# Patient Record
Sex: FEMALE
[De-identification: names, ages, dates, MRNs, and addresses within clinical notes are randomized per-mention and may not be internally consistent; named-entity substitution may affect disease eponyms.]

---

## 2018-05-07 ENCOUNTER — HOSPITAL ENCOUNTER (EMERGENCY)
Dept: HOSPITAL 88 - ER | Age: 83
LOS: 1 days | Discharge: NURSING, REHAB OR LONG-TERM CARE | End: 2018-05-08
Payer: MEDICARE

## 2018-05-07 VITALS — BODY MASS INDEX: 33.32 KG/M2 | HEIGHT: 65 IN | WEIGHT: 200 LBS

## 2018-05-07 PROCEDURE — 36415 COLL VENOUS BLD VENIPUNCTURE: CPT

## 2018-05-07 PROCEDURE — 99284 EMERGENCY DEPT VISIT MOD MDM: CPT

## 2018-05-07 PROCEDURE — 80048 BASIC METABOLIC PNL TOTAL CA: CPT

## 2018-05-07 PROCEDURE — 80076 HEPATIC FUNCTION PANEL: CPT

## 2018-05-08 VITALS — SYSTOLIC BLOOD PRESSURE: 182 MMHG | DIASTOLIC BLOOD PRESSURE: 96 MMHG

## 2018-05-08 LAB
ALBUMIN SERPL-MCNC: 2.6 G/DL (ref 3.5–5)
ALP SERPL-CCNC: 158 IU/L (ref 40–150)
ALT SERPL-CCNC: 34 IU/L (ref 0–55)
ANION GAP SERPL CALC-SCNC: 14.6 MMOL/L (ref 8–16)
BILIRUB CONJ SERPL-MCNC: 0.3 MG/DL (ref 0–0.5)
BUN SERPL-MCNC: 14 MG/DL (ref 7–26)
BUN/CREAT SERPL: 20 (ref 6–25)
CALCIUM SERPL-MCNC: 11 MG/DL (ref 8.4–10.2)
CHLORIDE SERPL-SCNC: 101 MMOL/L (ref 98–107)
CO2 SERPL-SCNC: 25 MMOL/L (ref 22–29)
EGFRCR SERPLBLD CKD-EPI 2021: > 60 ML/MIN (ref 60–?)
GLUCOSE SERPLBLD-MCNC: 105 MG/DL (ref 74–118)
POTASSIUM SERPL-SCNC: 3.6 MMOL/L (ref 3.5–5.1)
SODIUM SERPL-SCNC: 137 MMOL/L (ref 136–145)

## 2018-05-14 ENCOUNTER — HOSPITAL ENCOUNTER (INPATIENT)
Dept: HOSPITAL 88 - ER | Age: 83
LOS: 10 days | Discharge: HOSPICE | DRG: 308 | End: 2018-05-24
Attending: INTERNAL MEDICINE | Admitting: INTERNAL MEDICINE
Payer: MEDICARE

## 2018-05-14 VITALS — WEIGHT: 175.5 LBS | BODY MASS INDEX: 31.1 KG/M2 | HEIGHT: 63 IN

## 2018-05-14 VITALS — DIASTOLIC BLOOD PRESSURE: 81 MMHG | SYSTOLIC BLOOD PRESSURE: 180 MMHG

## 2018-05-14 DIAGNOSIS — I48.0: Primary | ICD-10-CM

## 2018-05-14 DIAGNOSIS — I69.319: ICD-10-CM

## 2018-05-14 DIAGNOSIS — M51.36: ICD-10-CM

## 2018-05-14 DIAGNOSIS — I69.354: ICD-10-CM

## 2018-05-14 DIAGNOSIS — Z91.81: ICD-10-CM

## 2018-05-14 DIAGNOSIS — C64.2: ICD-10-CM

## 2018-05-14 DIAGNOSIS — I50.32: ICD-10-CM

## 2018-05-14 DIAGNOSIS — R32: ICD-10-CM

## 2018-05-14 DIAGNOSIS — R13.10: ICD-10-CM

## 2018-05-14 DIAGNOSIS — Z74.01: ICD-10-CM

## 2018-05-14 DIAGNOSIS — I27.20: ICD-10-CM

## 2018-05-14 DIAGNOSIS — N39.0: ICD-10-CM

## 2018-05-14 DIAGNOSIS — F01.51: ICD-10-CM

## 2018-05-14 DIAGNOSIS — B96.20: ICD-10-CM

## 2018-05-14 DIAGNOSIS — I11.0: ICD-10-CM

## 2018-05-14 DIAGNOSIS — I69.391: ICD-10-CM

## 2018-05-14 DIAGNOSIS — F01.50: ICD-10-CM

## 2018-05-14 DIAGNOSIS — K29.70: ICD-10-CM

## 2018-05-14 DIAGNOSIS — Z66: ICD-10-CM

## 2018-05-14 DIAGNOSIS — E78.5: ICD-10-CM

## 2018-05-14 DIAGNOSIS — Z91.14: ICD-10-CM

## 2018-05-14 DIAGNOSIS — E21.3: ICD-10-CM

## 2018-05-14 DIAGNOSIS — B95.62: ICD-10-CM

## 2018-05-14 DIAGNOSIS — G93.41: ICD-10-CM

## 2018-05-14 DIAGNOSIS — I50.1: ICD-10-CM

## 2018-05-14 LAB
ALBUMIN SERPL-MCNC: 2.3 G/DL (ref 3.5–5)
ALBUMIN/GLOB SERPL: 0.4 {RATIO} (ref 0.8–2)
ALP SERPL-CCNC: 208 IU/L (ref 40–150)
ALT SERPL-CCNC: 73 IU/L (ref 0–55)
ANION GAP SERPL CALC-SCNC: 12.6 MMOL/L (ref 8–16)
BACTERIA URNS QL MICRO: (no result) /HPF
BASOPHILS # BLD AUTO: 0.1 10*3/UL (ref 0–0.1)
BASOPHILS NFR BLD AUTO: 0.4 % (ref 0–1)
BILIRUB UR QL: NEGATIVE
BUN SERPL-MCNC: 16 MG/DL (ref 7–26)
BUN/CREAT SERPL: 23 (ref 6–25)
CALCIUM SERPL-MCNC: 11.6 MG/DL (ref 8.4–10.2)
CHLORIDE SERPL-SCNC: 101 MMOL/L (ref 98–107)
CHOLEST SERPL-MCNC: 130 MD/DL (ref 0–199)
CHOLEST/HDLC SERPL: 4.2 {RATIO} (ref 3–3.6)
CK MB SERPL-MCNC: 0.7 NG/ML (ref 0–5)
CK SERPL-CCNC: < 7 IU/L (ref 29–168)
CLARITY UR: (no result)
CO2 SERPL-SCNC: 26 MMOL/L (ref 22–29)
COLOR UR: YELLOW
DEPRECATED NEUTROPHILS # BLD AUTO: 10.6 10*3/UL (ref 2.1–6.9)
DEPRECATED RBC URNS MANUAL-ACNC: (no result) /HPF (ref 0–5)
EGFRCR SERPLBLD CKD-EPI 2021: > 60 ML/MIN (ref 60–?)
EOSINOPHIL # BLD AUTO: 0.3 10*3/UL (ref 0–0.4)
EOSINOPHIL NFR BLD AUTO: 2.2 % (ref 0–6)
EPI CELLS URNS QL MICRO: (no result) /LPF
ERYTHROCYTE [DISTWIDTH] IN CORD BLOOD: 16.3 % (ref 11.7–14.4)
GLOBULIN PLAS-MCNC: 5.3 G/DL (ref 2.3–3.5)
GLUCOSE SERPLBLD-MCNC: 118 MG/DL (ref 74–118)
HCT VFR BLD AUTO: 33.5 % (ref 34.2–44.1)
HDLC SERPL-MSCNC: 31 MG/DL (ref 40–60)
HGB BLD-MCNC: 9.9 G/DL (ref 12–16)
IRON SATN MFR SERPL: 2 % (ref 15–50)
IRON SERPL-MCNC: 5 UG/DL (ref 50–170)
KETONES UR QL STRIP.AUTO: NEGATIVE
LDLC SERPL CALC-MCNC: 82 MG/DL (ref 60–130)
LEUKOCYTE ESTERASE UR QL STRIP.AUTO: (no result)
LYMPHOCYTES # BLD: 1.6 10*3/UL (ref 1–3.2)
LYMPHOCYTES NFR BLD AUTO: 12.1 % (ref 18–39.1)
MCH RBC QN AUTO: 22.2 PG (ref 28–32)
MCHC RBC AUTO-ENTMCNC: 29.6 G/DL (ref 31–35)
MCV RBC AUTO: 75.3 FL (ref 81–99)
MONOCYTES # BLD AUTO: 0.6 10*3/UL (ref 0.2–0.8)
MONOCYTES NFR BLD AUTO: 4.9 % (ref 4.4–11.3)
NEUTS SEG NFR BLD AUTO: 79.9 % (ref 38.7–80)
NITRITE UR QL STRIP.AUTO: POSITIVE
PLATELET # BLD AUTO: 298 X10E3/UL (ref 140–360)
POTASSIUM SERPL-SCNC: 3.6 MMOL/L (ref 3.5–5.1)
PROT UR QL STRIP.AUTO: (no result)
RBC # BLD AUTO: 4.45 X10E6/UL (ref 3.6–5.1)
SODIUM SERPL-SCNC: 136 MMOL/L (ref 136–145)
SP GR UR STRIP: 1.01 (ref 1.01–1.02)
TIBC SERPL-MCNC: 280 UG/DL (ref 261–478)
TRANSFERRIN SERPL-MCNC: 200 MG/DL (ref 180–382)
TRIGL SERPL-MCNC: 85 MG/DL (ref 0–149)
TSH SERPL DL<=0.005 MIU/L-ACNC: 2.07 UIU/ML (ref 0.35–4.94)
UROBILINOGEN UR STRIP-MCNC: 1 MG/DL (ref 0.2–1)
WBC #/AREA URNS HPF: (no result) /HPF (ref 0–5)

## 2018-05-14 PROCEDURE — 87086 URINE CULTURE/COLONY COUNT: CPT

## 2018-05-14 PROCEDURE — 80053 COMPREHEN METABOLIC PANEL: CPT

## 2018-05-14 PROCEDURE — 82553 CREATINE MB FRACTION: CPT

## 2018-05-14 PROCEDURE — 85610 PROTHROMBIN TIME: CPT

## 2018-05-14 PROCEDURE — 93005 ELECTROCARDIOGRAM TRACING: CPT

## 2018-05-14 PROCEDURE — 83970 ASSAY OF PARATHORMONE: CPT

## 2018-05-14 PROCEDURE — 36415 COLL VENOUS BLD VENIPUNCTURE: CPT

## 2018-05-14 PROCEDURE — 82550 ASSAY OF CK (CPK): CPT

## 2018-05-14 PROCEDURE — 83735 ASSAY OF MAGNESIUM: CPT

## 2018-05-14 PROCEDURE — 74230 X-RAY XM SWLNG FUNCJ C+: CPT

## 2018-05-14 PROCEDURE — 82746 ASSAY OF FOLIC ACID SERUM: CPT

## 2018-05-14 PROCEDURE — 82330 ASSAY OF CALCIUM: CPT

## 2018-05-14 PROCEDURE — 80202 ASSAY OF VANCOMYCIN: CPT

## 2018-05-14 PROCEDURE — 80061 LIPID PANEL: CPT

## 2018-05-14 PROCEDURE — 85025 COMPLETE CBC W/AUTO DIFF WBC: CPT

## 2018-05-14 PROCEDURE — 83519 RIA NONANTIBODY: CPT

## 2018-05-14 PROCEDURE — 83036 HEMOGLOBIN GLYCOSYLATED A1C: CPT

## 2018-05-14 PROCEDURE — 96361 HYDRATE IV INFUSION ADD-ON: CPT

## 2018-05-14 PROCEDURE — 0DB78ZX EXCISION OF STOMACH, PYLORUS, VIA NATURAL OR ARTIFICIAL OPENING ENDOSCOPIC, DIAGNOSTIC: ICD-10-PCS | Performed by: INTERNAL MEDICINE

## 2018-05-14 PROCEDURE — 84443 ASSAY THYROID STIM HORMONE: CPT

## 2018-05-14 PROCEDURE — 83880 ASSAY OF NATRIURETIC PEPTIDE: CPT

## 2018-05-14 PROCEDURE — 84439 ASSAY OF FREE THYROXINE: CPT

## 2018-05-14 PROCEDURE — 0DB88ZX EXCISION OF SMALL INTESTINE, VIA NATURAL OR ARTIFICIAL OPENING ENDOSCOPIC, DIAGNOSTIC: ICD-10-PCS | Performed by: INTERNAL MEDICINE

## 2018-05-14 PROCEDURE — 84484 ASSAY OF TROPONIN QUANT: CPT

## 2018-05-14 PROCEDURE — 77075 RADEX OSSEOUS SURVEY COMPL: CPT

## 2018-05-14 PROCEDURE — 82607 VITAMIN B-12: CPT

## 2018-05-14 PROCEDURE — 82164 ANGIOTENSIN I ENZYME TEST: CPT

## 2018-05-14 PROCEDURE — 87040 BLOOD CULTURE FOR BACTERIA: CPT

## 2018-05-14 PROCEDURE — 0DH68UZ INSERTION OF FEEDING DEVICE INTO STOMACH, VIA NATURAL OR ARTIFICIAL OPENING ENDOSCOPIC: ICD-10-PCS | Performed by: INTERNAL MEDICINE

## 2018-05-14 PROCEDURE — 83540 ASSAY OF IRON: CPT

## 2018-05-14 PROCEDURE — 84165 PROTEIN E-PHORESIS SERUM: CPT

## 2018-05-14 PROCEDURE — 84166 PROTEIN E-PHORESIS/URINE/CSF: CPT

## 2018-05-14 PROCEDURE — 76770 US EXAM ABDO BACK WALL COMP: CPT

## 2018-05-14 PROCEDURE — 86592 SYPHILIS TEST NON-TREP QUAL: CPT

## 2018-05-14 PROCEDURE — 99284 EMERGENCY DEPT VISIT MOD MDM: CPT

## 2018-05-14 PROCEDURE — 82306 VITAMIN D 25 HYDROXY: CPT

## 2018-05-14 PROCEDURE — 71045 X-RAY EXAM CHEST 1 VIEW: CPT

## 2018-05-14 PROCEDURE — 87186 SC STD MICRODIL/AGAR DIL: CPT

## 2018-05-14 PROCEDURE — 84100 ASSAY OF PHOSPHORUS: CPT

## 2018-05-14 PROCEDURE — 84466 ASSAY OF TRANSFERRIN: CPT

## 2018-05-14 PROCEDURE — 81001 URINALYSIS AUTO W/SCOPE: CPT

## 2018-05-14 PROCEDURE — 80048 BASIC METABOLIC PNL TOTAL CA: CPT

## 2018-05-14 RX ADMIN — HYDRALAZINE HYDROCHLORIDE PRN MG: 20 INJECTION INTRAMUSCULAR; INTRAVENOUS at 20:59

## 2018-05-14 NOTE — XMS REPORT
Patient Summary Document

 Created on: 2018



MICAH ROACH

External Reference #: [id number]

: 1929

Sex: Female



Demographics







 Address  [address]

[place] [address]

 

 Home Phone  [phone]

 

 Preferred Language  Unknown

 

 Marital Status  Unknown

 

 Bahai Affiliation  Unknown

 

 Race  Unknown

 

 Additional Race(s)  

 

 Ethnic Group  Unknown





Author







 [facility]  [facility]

 

 [facility]

 

 Address  Unknown

 

 Phone  Unavailable







Care Team Providers







 Care Team Member Name  Role  Phone

 

 LILI ROWLEY  Unavailable  Unavailable

 

 RAMÍREZ SOUTH  Unavailable  Unavailable

 

 PRINCESS HENSON  Unavailable  Unavailable







Problems

This patient has no known problems.



Allergies, Adverse Reactions, Alerts

This patient has no known allergies or adverse reactions.



Medications

This patient has no known medications.



Results







 Test Description  Test Time  Test Comments  Text Results  Atomic Results  
Result Comments









 CHEST SINGLE (PORTABLE)            [place] [address]      Patient Name: BARRY ROACH   MR #: [id number]    : 1929 Age/Sex: 88/F  Acct #: 
[id number] Req #: [id number]  Adm Physician:     Ordered by: LILI ROWLEY MD  Report #: [id number]   Location: ER  Room/Bed:     ________________________
___________________________________________________________________________    
Procedure: [id number] DX/CHEST SINGLE (PORTABLE)  Exam Date: 18           
                 Exam Time: 1705       REPORT STATUS: Signed    PROCEDURE: 
CHEST SINGLE (PORTABLE)   COMPARISON: 18.   INDICATIONS: CHEST PAIN. 
ALTERED MENTAL STATUS       FINDINGS:         LUNGS: Well-inflated. No mass, 
infiltrate, or vascular congestion.          PLEURA: No effusions or 
pneumothorax.       HEART  T     MEDIASTINUM: Stable cardiomegaly and aortic 
ectasia.       BONES  T    SOFT TISSUES: Degenerative changes of the shoulders 
are stable, left    more severe than the right. Soft tissues are unremarkable..
           CONCLUSION:          Stable cardiomegaly without vascular 
congestion. No acute pulmonary    process.               Dictated by:  Wilberto Benitez M.D. on 2018 at 17:27        Electronically approved by:  Wilberto Benitez M.D. on 2018 at    17:27                Dictated By: WILBERTO BENITEZ MD  Electronically Signed By: WILBERTO BENITEZ MD on 18  
Transcribed By: SIMONE on 18       COPY TO:   LILI ROWLEY MD   
        

 

 CHEST SINGLE (PORTABLE)            [place] [address]      Patient Name: BARRY ROAHC   MR #: [id number]    : 1929 Age/Sex: 88/F  Acct #: 
[id number] Req #: [id number]  [facility] Physician: RAMÍREZ SOUTH MD    Ordered by: 
RORY BLACKBURN MD  Report #: [id number]   Location: MED/SURG  Room/Bed: [facility]  
  ______________________________________________________________________________
_____________________    Procedure: [id number] DX/CHEST SINGLE (PORTABLE)  Exam 
Date: 18                            Exam Time: 1325       REPORT STATUS: 
Signed    PROCEDURE:   A single AP view of the chest.       COMPARISON: None.  
     INDICATIONS:   ELEVATED WHITE BLOOD COUNT           FINDINGS:   Lines/tubes
:  None.       Lungs: Lungs are well-inflated. Minimal bibasilar atelectasis. 
No    consolidation       Pleura:  There is no pleural effusion or 
pneumothorax.       Heart and mediastinum: Enlarged cardiac silhouette with 
central    pulmonary venous congestion.       Bones:  No acute bony 
abnormality.       IMPRESSION:        1. enlarged cardiac silhouette with 
central pulmonary venous    congestion. Mild bibasal atelectasis.            
Michael Garcia M.D.     Dictated by:  Michael Garcia M.D. on 2018 
at 14:11        Electronically approved by:  Michael Garcia M.D. on 2018 at    14:11                Dictated By: MICHAEL GARCIA MD  Electronically 
Signed By: MICHAEL GARCIA MD on 18 1411  Transcribed By: SIMONE on  141       COPY TO:   RORY BLACKBURN MD           

 

 FOOT LEFT COMPLETE            [place] [address]      Patient Name: BARRY ROACH
   MR #: [id number]    : 1929 Age/Sex: 88/F  Acct #: [id number] Req #
: [id number]  Adm Physician: RAMÍREZ SOUTH MD    Ordered by: DANTE FISCHER DO  
Report #: [id number]   Location: MED/SURG  Room/Bed: [facility]    ___________________
_______________________________________________________________________________
_    Procedure: [id number] DX/FOOT LEFT COMPLETE  Exam Date: 18           
                 Exam Time: 1435       REPORT STATUS: Signed    PROCEDURE:   X-
RAY LEFT FOOT, COMPLETE       COMPARISON:   None.       INDICATIONS:   RULE OUT 
FRACTURE       FINDINGS:      Generalized osteopenia, which limits evaluation 
of the bony structures.   No definite acute, displaced fractures or 
dislocations.   No lytic or blastic lesions.   Small inferior calcaneal 
enthesophyte.   Mild degenerative changes in the midfoot joints.   No 
significant soft tissue swelling.          CONCLUSION:      Generalized 
osteopenia limits evaluation of bony structures. No    definite acute displaced 
fracture or dislocation.        Michael Garcia M.D.     Dictated by:  
Michael Garcia M.D. on 2018 at 19:03        Electronically approved by
:  Michael Garcia M.D. on 2018 at    19:03                Dictated By: 
MICHAEL GARCIA MD  Electronically Signed By: MICHAEL GARCIA MD on 04/18/18 1903
  Transcribed By: SIMONE on 18       COPY TO:   DANTE FISCHER DO     
      

 

 HAND 3+ VIEWS LEFT            [place] [address]      Patient Name: BARRY ROACH
   MR #: [id number]    : 1929 Age/Sex: 88/F  Acct #: [id number] Req #
: [id number]  Adm Physician: RAMÍREZ SOUTH MD    Ordered by: RAMÍREZ OVALLE DO  
Report #: [id number]   Location: MED/SURG  Room/Bed: [facility]    ___________________
_______________________________________________________________________________
_    Procedure: [id number] DX/HAND 3+ VIEWS LEFT  Exam Date: 18           
                 Exam Time: 1010       REPORT STATUS: Signed    WRIST COMPLETE 
LEFT, HAND 3+ VIEWS LEFT       HISTORY:  Pain. Fall   COMPARISON: None 
available.           FINDINGS:   Bones:   Osseous demineralization which limits 
evaluation for subtle nondisplaced   fractures.   Cortical step-off of the base 
of the fourth proximal phalanx without   intra-articular extension. Chronic 
appearing fracture deformity of the fifth   phalanx.   Osseous alignment is 
within normal limits.      Joints:   Mild degenerative changes of the carpal 
and radiocarpal joints and scattered   interphalangeal joints.      Soft tissues
:   The soft tissues appear unremarkable.         IMPRESSION:    Minimally 
displaced fracture of the base of the fourth proximal phalanx.      Signed by: 
DR. aJspal Romero MD on 2018 10:54 AM        Dictated By: JASPAL ROMERO MD  Electronically Signed By: JASPAL ROMERO MD on 18  Transcribed By
: SERA on 18       COPY TO:   RAMÍREZ OVALLE DO           

 

 WRIST COMPLETE LEFT            [place] [address]      Patient Name: BARRY ROACH
   MR #: [id number]    : 1929 Age/Sex: 88/F  Acct #: [id number] Req #
: [id number]  Adm Physician: RAMÍREZ SOUTH MD    Ordered by: RAMÍREZ OVALLE DO  
Report #: [id number]   Location: MED/SURG  Room/Bed: [facility]    ___________________
_______________________________________________________________________________
_    Procedure: [id number] DX/WRIST COMPLETE LEFT  Exam Date: 18          
                  Exam Time: 1010       REPORT STATUS: Signed    WRIST COMPLETE 
LEFT, HAND 3+ VIEWS LEFT       HISTORY:  Pain. Fall   COMPARISON: None 
available.           FINDINGS:   Bones:   Osseous demineralization which limits 
evaluation for subtle nondisplaced   fractures.   Cortical step-off of the base 
of the fourth proximal phalanx without   intra-articular extension. Chronic 
appearing fracture deformity of the fifth   phalanx.   Osseous alignment is 
within normal limits.      Joints:   Mild degenerative changes of the carpal 
and radiocarpal joints and scattered   interphalangeal joints.      Soft tissues
:   The soft tissues appear unremarkable.         IMPRESSION:    Minimally 
displaced fracture of the base of the fourth proximal phalanx.      Signed by: 
DR. Jaspal Romero MD on 2018 10:54 AM        Dictated By: JASPAL ROMERO MD  Electronically Signed By: JASPAL ROMERO MD on 18 1054  Transcribed By
: SERA on 18 1054       COPY TO:   RAMÍREZ OVALLE DO           

 

 PELVIS AP 1-2 VIEWS            [place] [address]      Patient Name: BARRY ROACH
   MR #: [id number]    : 1929 Age/Sex: 88/F  Acct #: [id number] Req #
: [id number]  Adm Physician:     Ordered by: DEVAN CHRISTOPHER MD  Report #: [id number]
[id number]   Location: ER  Room/Bed:     ____________________________________________
_______________________________________________________    Procedure: [id number] 
DX/PELVIS AP 1-2 VIEWS  Exam Date:                             Exam Time:      
  REPORT STATUS: Signed    EXAM: PELVIS AP 1-2 VIEWS   INDICATION: Fall, pain   
COMPARISON: None      FINDINGS:   BONES:    No acute fractures.      JOINTS:   
Left hip arthroplasty. No evidence of hardware failure.      SOFT TISSUES:   
Normal      IMPRESSION:   No evidence of an acute pelvic fracture.         
Signed by: Dr. Natalia Barrios M.D. on 2018 6:57 AM        Dictated By: 
NATALIA BARRIOS MD  Electronically Signed By: NATALIA BARRIOS MD on 18 0657  
Transcribed By: SERA on 18 0657       COPY TO:   DEVAN CHRISTOPHER MD   
        

 

 SHOULDER LEFT COMPLETE            [place] [address]      Patient Name: BARRY ROACH   MR #: [id number]    : 1929 Age/Sex: 88/F  Acct #: 
[id number] Req #: [id number]  Adm Physician:     Ordered by: DEVAN CHRISTOPHER MD  Report #: [id number]   Location: ER  Room/Bed:     __________________________
_________________________________________________________________________    
Procedure: [id number] DX/SHOULDER LEFT COMPLETE  Exam Date:                     
        Exam Time:        REPORT STATUS: Signed    EXAM: SHOULDER LEFT COMPLETE
, AP, axial and scapular y-view   INDICATION: Left shoulder pain after fall   
COMPARISON: None      FINDINGS:   BONES:    Chronic deformity of the proximal 
left humerus and humeral head.      JOINTS:   Chronic deformity of the 
glenohumeral joint      SOFT TISSUES:   Normal      IMPRESSION:   Chronic 
deformity of the proximal left humerus and glenohumeral joint, which   appears 
fused.         Signed by: Dr. Natalia Barrios M.D. on 2018 6:56 AM     
   Dictated By: NATALIA BARRIOS MD  Electronically Signed By: NATALIA BARRIOS MD on 
18  Transcribed By: SERA on 18       COPY TO:   DEVAN CHRISTOPHER MD           

 

 CT CHEST WO            [place] [address]      Patient Name: BARRY ROACH   
MR #: [id number]    : 1929 Age/Sex: 88/F  Acct #: [id number] Req #: 
[id number]  Adm Physician:     Ordered by: DEVAN CHRISTOPHER MD  Report #: [id number]
[id number]   Location: ER  Room/Bed:     _____________________________________________
______________________________________________________    Procedure: [id number] 
CT/CT CHEST WO  Exam Date:                             Exam Time:        REPORT 
STATUS: Signed    EXAM: CT CHEST WO   INDICATION:  Back pain after tripping 
over RUG, left shoulder pain   COMPARISON:  CT of the abdomen and pelvis 
November 15, 2017   TECHNIQUE: Multidetector CT scanning of the chest was 
performed.  Coronal and   sagittal multiplanar reformations were obtained. 
Routine protocol performed.   IV Contrast: None   CTDIvol has been reviewed. It 
is below the limits set by the Radiation Protocol   Committee (RPC).      
FINDINGS:   LUNGS AND AIRWAYS: The trachea and major bronchi are unremarkable. 
  Very mild septal thickening and groundglass opacities.       PLEURA: Small 
amount of fluid in the left major fissure. No pneumothorax.      HEART, 
MEDIASTINUM, VESSELS: Cardiomegaly and small pericardial effusion.   
Atherosclerotic changes of the thoracic aorta without aneurysm. The main   
pulmonary artery is enlarged to 3.8 cm.      UPPER ABDOMEN: Lobulated kidneys 
bilaterally. Relatively stable appearance of   the 4.6 cm left renal mass.      
MUSCULOSKELETAL: No acute fracture. Chronic deformity of the left shoulder.    
  IMPRESSION:   No evidence of acute injury to the chest.      Cardiomegaly, 
small pericardial effusion, mild edema and possible pulmonary   hypertension.  
    Partially visualized left renal mass as previously described.            
Signed by: Dr. Natalia Barrios M.D. on 2018 7:03 AM        Dictated By: 
NATALIA BARRIOS MD  Electronically Signed By: NATALIA BARRIOS MD on 18  
Transcribed By: SERA on 18       COPY TO:   DEVAN CHRISTOPHER MD   
        

 

 CT BRAIN WO            [place] [address]      Patient Name: BARRY ROACH   
MR #: [id number]    : 1929 Age/Sex: 88/F  Acct #: [id number] Req #: 
[id number]  Adm Physician:     Ordered by: DEVAN CHRISTOPHER MD  Report #: [id number]
[id number]   Location: ER  Room/Bed:     _____________________________________________
______________________________________________________    Procedure: [id number] 
CT/CT BRAIN WO  Exam Date: 18                            Exam Time: 0610 
      REPORT STATUS: Signed    Exam: Head and cervical spine CT without IV 
contrast   History: Trauma, fall   Comparison studies: None      Technique:   
Axial images were obtained from the brain and cervical spine.   Coronal and 
sagittal images reconstructed from the axial data.   Intravenous contrast: None
      Findings:      Head CT:      Scalp: No abnormalities.   Bones: No 
fractures, blastic or lytic lesions.      Extra-axial spaces: No masses.  No 
fluid collections.      Brain sulci: Mildly prominent.   Ventricles: Mild 
compensatory dilatation. No hydrocephalus.      Parenchyma:    Recent (acute to 
subacute) nonhemorrhagic vascular insult in the right PCA   vascular territory 
with hypodensity and sulcal effacement in the right inferior   occipital lobe (
lingual and inferior occipital gyrus) which also involves the   medial 
occipitotemporal/fusiform gyri and right hippocampus. There are   age-
indeterminate lacunar infarcts in the right thalamocapsular region and in   the 
left vivien. Lacunar infarct in the vivien may be possibly be chronic with   
associated Wallerian degeneration along the left cortical spinal tract. There   
is a chronic lacunar infarct in the right thalamus. Scattered and mildly   
confluent hypodensities in the supratentorial white matter are nonspecific but 
  most compatible with chronic small vessel ischemic changes.      Sellar/
suprasellar region: No abnormalities.   Craniocervical junction: The foramen 
magnum is patent.  No Chiari one   malformation.      Cervical spine CT:      
Fractures: No acute fractures. Chronic type II dens fracture. Mild posterior   
angulation of the chronically fractured dens which is without osseous union to 
  the C2 vertebral body but is chronically fused to the anterior C1 arch. 
Chronic   reactive production at the tip of the dens with obliteration of the 
normal   basion-dental interval.      Soft tissues: No gross acute 
abnormalities.      Atlantoaxial articulation: Intact.   Alignment: 
Straightened cervical curvature. No acute subluxations.   Cervicomedullary 
junction: No abnormalities. The foramen magnum is patent.      Vertebrae:    No 
infection or neoplasm.      Degenerative changes:    Degenerated disks with 
mild loss of disc height and disc calcification from C2   to T1. Mild canal 
stenosis at C6-C7 due to disc osteophyte complex. Multilevel   moderate to 
severe facet arthrosis worse on the left at C4-C5. Multilevel   uncovertebral 
facet arthrosis result in multilevel foraminal stenosis (mild   left at C3-C4 
moderate left and mild right at C4-C5 moderate bilaterally at   C5-C6 and mild 
bilaterally at C6-C7).      Incidental findings:   Atherosclerotic 
calcifications in the cervical carotid bulbs, carotid siphons   and in the 
intradural vertebral arteries. Intraocular lens replacements related   to 
previous cataract surgery. Small focal gas at the right tracheoesophageal   
junction of the thoracic inlet probably is a small tracheal diverticulum.      
IMPRESSION:      Head CT:   1.  Recent (acute to subacute) nonhemorrhagic 
vascular insult in the right PCA   territory.   2.  Age-indeterminate right 
right thalamocapsular and pontine lacunar infarcts.   3.  No acute hemorrhage 
or other acute posttraumatic abnormalities.   4.  Mild to moderate chronic 
microvascular ischemic changes.    5.  Mild generalized volume loss.      
Cervical spine CT:   1.  No acute cervical spine fractures or subluxations.   
2.  Chronic type II dens fracture.   3.  Degenerative changes as described.   
4.  Cannot adequately evaluate ligament, spinal cord and or vascular   
abnormalities on the basis of this examination.      Findings discussed with 
Dr. Ovalle at 7:22 AM on 2018.      Signed by: Dr. Manny Cuello M.D. on  7:39 AM        Dictated By: MANNY CUELLO MD  Electronically Signed 
By: MANNY CUELLO MD on 18  Transcribed By: SERA on 18       COPY TO:   DEVAN CHRISTOPHER MD           

 

 CT CERVICAL SPINE WO            [place] [address]      Patient Name: BARRY ROACH
   MR #: [id number]    : 1929 Age/Sex: 88/F  Acct #: [id number] Req #
: [id number]  Adm Physician:     Ordered by: DEVAN CHRISTOPHER MD  Report #: [id number]
[id number]   Location: ER  Room/Bed:     ____________________________________________
_______________________________________________________    Procedure: [id number] 
CT/CT CERVICAL SPINE WO  Exam Date: 18                            Exam 
Time: 0610       REPORT STATUS: Signed    Exam: Head and cervical spine CT 
without IV contrast   History: Trauma, fall   Comparison studies: None      
Technique:   Axial images were obtained from the brain and cervical spine.   
Coronal and sagittal images reconstructed from the axial data.   Intravenous 
contrast: None      Findings:      Head CT:      Scalp: No abnormalities.   
Bones: No fractures, blastic or lytic lesions.      Extra-axial spaces: No 
masses.  No fluid collections.      Brain sulci: Mildly prominent.   Ventricles
: Mild compensatory dilatation. No hydrocephalus.      Parenchyma:    Recent (
acute to subacute) nonhemorrhagic vascular insult in the right PCA   vascular 
territory with hypodensity and sulcal effacement in the right inferior   
occipital lobe (lingual and inferior occipital gyrus) which also involves the   
medial occipitotemporal/fusiform gyri and right hippocampus. There are   age-
indeterminate lacunar infarcts in the right thalamocapsular region and in   the 
left vivien. Lacunar infarct in the vivien may be possibly be chronic with   
associated Wallerian degeneration along the left cortical spinal tract. There   
is a chronic lacunar infarct in the right thalamus. Scattered and mildly   
confluent hypodensities in the supratentorial white matter are nonspecific but 
  most compatible with chronic small vessel ischemic changes.      Sellar/
suprasellar region: No abnormalities.   Craniocervical junction: The foramen 
magnum is patent.  No Chiari one   malformation.      Cervical spine CT:      
Fractures: No acute fractures. Chronic type II dens fracture. Mild posterior   
angulation of the chronically fractured dens which is without osseous union to 
  the C2 vertebral body but is chronically fused to the anterior C1 arch. 
Chronic   reactive production at the tip of the dens with obliteration of the 
normal   basion-dental interval.      Soft tissues: No gross acute 
abnormalities.      Atlantoaxial articulation: Intact.   Alignment: 
Straightened cervical curvature. No acute subluxations.   Cervicomedullary 
junction: No abnormalities. The foramen magnum is patent.      Vertebrae:    No 
infection or neoplasm.      Degenerative changes:    Degenerated disks with 
mild loss of disc height and disc calcification from C2   to T1. Mild canal 
stenosis at C6-C7 due to disc osteophyte complex. Multilevel   moderate to 
severe facet arthrosis worse on the left at C4-C5. Multilevel   uncovertebral 
facet arthrosis result in multilevel foraminal stenosis (mild   left at C3-C4 
moderate left and mild right at C4-C5 moderate bilaterally at   C5-C6 and mild 
bilaterally at C6-C7).      Incidental findings:   Atherosclerotic 
calcifications in the cervical carotid bulbs, carotid siphons   and in the 
intradural vertebral arteries. Intraocular lens replacements related   to 
previous cataract surgery. Small focal gas at the right tracheoesophageal   
junction of the thoracic inlet probably is a small tracheal diverticulum.      
IMPRESSION:      Head CT:   1.  Recent (acute to subacute) nonhemorrhagic 
vascular insult in the right PCA   territory.   2.  Age-indeterminate right 
right thalamocapsular and pontine lacunar infarcts.   3.  No acute hemorrhage 
or other acute posttraumatic abnormalities.   4.  Mild to moderate chronic 
microvascular ischemic changes.    5.  Mild generalized volume loss.      
Cervical spine CT:   1.  No acute cervical spine fractures or subluxations.   
2.  Chronic type II dens fracture.   3.  Degenerative changes as described.   
4.  Cannot adequately evaluate ligament, spinal cord and or vascular   
abnormalities on the basis of this examination.      Findings discussed with 
Dr. Ovalle at 7:22 AM on 2018.      Signed by: Dr. Manny Cuello M.D. on  7:39 AM        Dictated By: MANNY CUELLO MD  Electronically Signed 
By: MANNY CUELLO MD on 18  Transcribed By: SERA on 18       COPY TO:   DEVAN CHRISTOPHER MD           

 

 BONE and/or JOINT WHOLE BODY            [place] [address]      Patient Name: 
MICAH ROACH   MR #: [id number]    : 1929 Age/Sex: 88/F  Acct #: 
[id number] Req #: [id number]  [facility] Physician:     Ordered by: PRINCESS HENSON MD  Report #: [id number]   Location: NM  Room/Bed:     __________________________
_________________________________________________________________________    
Procedure: [id number] NM/BONE and/or JOINT WHOLE BODY  Exam Date: 17      
                      Exam Time: 1130       REPORT STATUS: Signed    Bone Scan, 
delayed phase       INDICATION: 88 F with intermittent mid back pain.  Recent 
Diagnosis of renal   cell carcinoma.  She has sustained multiple falls.  MRI 
shows chronic   compression fracture at T11.      COMPARISON: CT abdomen and 
pelvis 11/15/2017; MRI spine 10/26/2017      REPORT: Following intravenous 
administration of 20 mCi of Tc-99m MDP, dynamic   flow and immediate blood pool 
images of the lower thoracic and lumbar spine and   3-hour delayed total body 
images in the anterior and posterior projections  and   selected spot images 
were obtained.       Dynamic flow and immediate blood pool images of the lower 
thoracic and lumbar   spine are unremarkable.      Right knee and left hip 
prostheses are noted and show no adjacent tracer   accumulation of concern.  
Small foci of increased tracer activity are seen in   the left glenoid, L3 and 
the inferior aspect of the right SI joint.  Very   mildly increased tracer is 
seen at T11. Mildly increased tracer is seen   bilaterally in L5/S1 in a 
pattern typical of degenerative change.   Otherwise,   distribution of tracer 
activity is unremarkable throughout the skeletal system.            No abnormal 
accumulation of tracer is seen in the soft tissues or urinary   tract.      
IMPRESSION:       1.  Osteoblastic lesions in the left glenoid, L3 and right SI 
joint inferiorly   are nonspecific in appearance and may represent metastases 
versus degenerative   changes. The lesion in the left glenoid is most worrisome 
for bone metastasis   and correlative imaging is warranted.  The lesions in L3 
and the right SI joint   don't have definite CT correlates.       2.  Mild 
osteoblastic process in T11 consistent with known chronic compression   
fracture.  Degenerative change is noted in L5/S1 bilaterally and corresponds to
   degenerative change seen on CT 11/15/2017.       Signed by: Dr. Petr Cazares M.D. on 2017 7:05 PM        Dictated By: PETR CAZARES MD  Electronically 
Signed By: PETR CAZARES MD on 17  Transcribed By: SERA on 17       COPY TO:   PRINCESS HENSON MD           

 

 CT ABDOMEN W            [place] [address]      Patient Name: MICAH ROACH   
MR #: [id number]    : 1929 Age/Sex: 88/F  Acct #: [id number] Req #: 
[id number]  Adm Physician:     Ordered by: RAMÍREZ SOUTH MD  Report #: [id number] 
  Location: CT  Room/Bed:     __________________________________________________
_________________________________________________    Procedure: [id number] CT/CT 
ABDOMEN W  Exam Date: 11/15/17                            Exam Time: 1730       
REPORT STATUS: Signed    EXAM: CT Abdomen and Pelvis WITH contrast     
INDICATION: EXAM: CT Abdomen WITH contrast     INDICATION: Left kidney mass 
partially visualized on the recent MRI of lumbar   spine dated 10/26/17.   
COMPARISON: None.   TECHNIQUE: Abdomen and pelvis were scanned utilizing a 
multidetector helical   scanner from the lung base to the iliac crests after 
administration of IV   contrast. Coronal and sagittal reformations were 
obtained. Routine protocol was   performed. Scan was performed when during 
portal venous phase.               IV CONTRAST: 100 mL of Omnipaque Isovue-370 
              ORAL CONTRAST: Water               RADIATION DOSE: Total DLP: 
373.87 mGy*cm                Estimated effective dose: (DLP x 0.015 x size 
factor) mSv               COMPLICATIONS: None      FINDINGS:      LINES and 
TUBES: None.      LOWER THORAX:  Right middle lobe and lingular linear 
atelectasis. Mild   calcifications of aortic and mitral annulus.      
HEPATOBILIARY: 1.1 cm rounded low-attenuation lesion in the lateral aspect of   
the right hepatic lobe on image 28 series 2 suggestive of a cyst. No biliary   
ductal dilation.       GALLBLADDER: Status post cholecystectomy. Bilateral 
cortical scarring.   Heterogeneous lesion partially exophytic of the upper pole 
of the left kidney   anteriorly measures 4.1 x 3.4 x 4.6 cm in sagittal, AP and 
transverse   dimensions, consistent with a renal cell carcinoma to proven 
otherwise.      SPLEEN: No splenomegaly.       PANCREAS: No focal masses or 
ductal dilatation.        ADRENALS: No adrenal nodules          KIDNEYS/URETERS
: Kidneys enhance symmetrically.  No hydronephrosis. No cystic   or solid mass 
lesions.  No stones.      GI TRACT: No abnormal distention, wall thickening, or 
evidence of bowel   obstruction.       Appendix is not visualized.      LYMPH 
NODES: No lymphadenopathy.      VESSELS: Atherosclerotic calcifications of the 
aorta and iliac arteries without   aneurysmal dilatation.      PERITONEUM / 
RETROPERITONEUM: No free air or fluid.      BONES: Generalized osteopenia. Mild 
compression deformity of T11. Mild loss of   height of the L1 vertebral body.  
    SOFT TISSUES: Unremarkable.         IMPRESSION:    1.  4.6 cm mass in the 
upper pole of the left kidney consistent with renal cell   carcinoma. No 
evidence of metastatic disease within the abdomen and pelvis.   Recommend 
urology consultation.      Signed by: Dr. JAMES Shannon M.D. on 11/15/
2017 6:43 PM        Dictated By: ELSA SHANNON MD, MD  Electronically Signed By
: ELSA SHANNON MD, MD on 11/15/17 1843  Transcribed By: SERA on 11/15/17 
1843       COPY TO:   RAMÍREZ SOUTH MD           

 

 SP LUMBAR, COMPLETE MIN 4VW            [place] [address]      Patient Name: 
MICAH ROACH   MR #: [id number]    : 1929 Age/Sex: 88/F  Acct #: 
[id number] Req #: [id number]  Adm Physician:     Ordered by: RAMÍREZ SOUTH MD  
Report #: [id number]   Location: MRI  Room/Bed:     _____________________________
______________________________________________________________________    
Procedure: [id number] DX/SP LUMBAR, COMPLETE MIN 4VW  Exam Date: 10/26/17       
                     Exam Time: 1145       REPORT STATUS: Signed    PROCEDURE: L
-SPINE COMPLETE 5 views including bilateral obliques.       COMPARISON: None.  
     INDICATIONS: BACK PAIN       FINDINGS:   The lumbar spine is in anatomic 
alignment without evidence of fracture,    spondylolisthesis, or spondylolysis.
  Vertebral body heights and disc    spaces are maintained.  Moderate facet 
arthrosis with severe at L4-L5    and L5-S1. The paraspinal soft tissues are 
normal.       Age-indeterminate compression deformity of T11 vertebral body 
with    roughly 50% height loss.       Significant atherosclerotic 
calcifications in the aorta.   Left total hip arthroplasty.   Right upper 
quadrant cholecystectomy clips.           CONCLUSION:   1. Mild to moderate 
degenerative changes in the lumbar spine,    predominantly involving the facet 
joints.   2. Age indeterminate T11 vertebral body compression deformity.        
Dictated by:  Lamin Nielson M.D. on 10/26/2017 at 12:41        Electronically 
approved by:  Lamin Nielson M.D. on 10/26/2017 at 12:41                Dictated By: 
LAMIN NIELSON MD  Electronically Signed By: LAMIN NIELSON MD on 10/26/17 124  Transcribed 
By: SIMONE on 10/26/17 1241       COPY TO:   RAMÍREZ SOUTH MD           

 

 MRI SPINE LUMBAR WO            [facility]   [address] [place] [address]      Patient Name: MICAH ROACH   MR #: [id number]    : 1929 Age/Sex: 88/F  Acct #: [id number] Req 
#: [id number]  Adm Physician:     Ordered by: RAMÍREZ SOUTH MD  Report #: [id number]
[id number]   Location: MRI  Room/Bed:     ____________________________________________
_______________________________________________________    Procedure: [id number] 
MRI/MRI SPINE LUMBAR WO  Exam Date: 10/26/17                            Exam 
Time: 1110       REPORT STATUS: Signed    EXAMINATION: MRI of the lumbar spine 
without contrast        HISTORY: Low back pain, radiculopathy with lower 
extremity numbness and   weakness   COMPARISON: None.   TECHNIQUE: Sagittal T1, 
T2, STIR; axial T2 and proton density.     Image quality: Motion artifact 
limits evaluation of some of the sequences.      FINDINGS:      It is assumed 
that there are 5 lumbar vertebrae.       Curvature/Alignment: Normal lordosis. 
      Vertebrae: No evidence of recent  fracture, infection, or neoplasm. Mild 
  kyphotic malalignment in thoracolumbar region due to chronic mild anterior   
wedging of the T11 vertebral body (decreased vertebral body height by   
approximately 25%), no posterior retropulsion or canal stenosis.       Conus: 
Normal, terminating at L1-L2       Cauda equina: Unremarkable.         Lower 
thoracic: Small 3 mm AP diameter left subarticular superior migrated   disc 
protrusion at T12-L1 without stenosis or compression       Paraspinal soft 
tissues: Prominent paraspinal lumbosacral musculature   atrophy. Partially 
visualized approximately 4.5 cm heterogeneous signal   intensity mass in the 
upper pole of the left kidney. Baseline irregular   bilateral kidneys which may 
be related to prior inflammatory/infection process.   Partially visualized 
nonspecific T2 hyperintense lesion in the right liver.      Degenerative changes
:          L1-L2: Symmetric disc bulge and facet arthrosis. Mild foraminal 
stenosis.          L2-L3: Mild symmetric disc bulge, ligamenta flava thickening 
and facet   arthrosis. Moderate spinal canal stenosis. No significant foraminal 
stenosis.          L3-L4: Bilateral facet arthrosis. Mild left foraminal 
stenoses          L4-L5: Mild asymmetric the left disc bulge (with a small 
posterior annular   fissure), ligamenta flava thickening and facet arthrosis. 
Moderate spinal canal   stenosis. Moderate left and mild right foraminal 
stenoses          L5-S1: Moderate facet arthrosis. Mild symmetric disc bulge. 
Mild bilateral   foraminal narrowing.      Sacroiliac joints:   Mild 
degenerative changes bilaterally.      IMPRESSION:      1.  Partially 
visualized mass in the upper pole of the left kidney. An abdomen   and pelvis 
CT with contrast is recommended for further evaluation.      2.  Moderate 
degenerative spinal canal stenosis at L2-3 and L4-5.      3.  Moderate 
degenerative foraminal stenosis on the left at L4-L5 and mild on   the right at 
L4-L5 and bilaterally at L5-S1.      4.  Mild chronic compression fracture of 
the T11 vertebral body with mild   kyphotic malalignment. No acute lumbar spine 
fractures.      The findings were discussed with the attending physician Dr. South at the time   of this dictation.      Signed by: Dr. Constance Chatterjee M.D. on 
10/26/2017 1:22 PM        Dictated By: CONSTANCE CHATTERJEE MD  Electronically Signed By
: CONSTANCE CHATTERJEE MD on 10/26/17 1322  Transcribed By: SERA on 10/26/17 1322    
   COPY TO:   RAMÍREZ SOUTH MD

## 2018-05-14 NOTE — DIAGNOSTIC IMAGING REPORT
PROCEDURE: CHEST SINGLE (PORTABLE)

COMPARISON: 4/20/18.

INDICATIONS: CHEST PAIN. ALTERED MENTAL STATUS

 

FINDINGS:



LUNGS: Well-inflated. No mass, infiltrate, or vascular congestion.

 

PLEURA: No effusions or pneumothorax.

 

HEART \T\ 

MEDIASTINUM: Stable cardiomegaly and aortic ectasia.

 

BONES \T\

SOFT TISSUES: Degenerative changes of the shoulders are stable, left 

more severe than the right. Soft tissues are unremarkable..

 

 

CONCLUSION:

 

Stable cardiomegaly without vascular congestion. No acute pulmonary 

process.

 

 

 

Dictated by:  Alvaro Azar M.D. on 5/14/2018 at 17:27     

Electronically approved by:  Alvaro Azar M.D. on 5/14/2018 at 

17:27

## 2018-05-14 NOTE — XMS REPORT
Continuity of Care Document

 Created on: 2018



LUANN ROACH

External Reference #: [id number]

: 1929

Sex: Female



Demographics







 Address  [address] [place]  [address]

 

 Home Phone  [phone]

 

 Preferred Language  Unknown

 

 Marital Status  Unknown

 

 Cheondoism Affiliation  Unknown

 

 Race  Other

 

 Ethnic Group  Unknown





Author







 Author  [facility]

 

 Organization  [facility]

 

 Address  [address]

[place]  [address]



 

 Phone  Unavailable







Support







 Name  Relationship  Address  Phone

 

 ROGELIO BERNARD M.D.  Caregiver  [address] [facility] 

[address]  [phone]

 

 RAMÍREZ SOUTH MD  Caregiver  [address] [place], [place]  [phone] [phone]

 

 RAMÍREZ SOUTH MD  Caregiver  [address] [place], [place]  [phone]

 

 RAMÍREZ SOUTH MD  Caregiver  [address] [place], [place]  [phone]

 

 RAMÍREZ SOUTH MD  Caregiver  [address] [place], [place]  [address] [phone]

 

 SYBIL ROACH  Next Of Kin  [address] [place]  [address] [phone]







Care Team Providers







 Care Team Member Name  Role  Phone

 

 RAMÍREZ SOUTH MD  PCP  [phone]







Insurance Providers







 Guarantor  Ginger Roach

 

 Address  [address] [place] [address]

 

 Phone  [phone]

 

 Email  N











 [facility]  [employer]age

 

 Policy Number  [id number]

 

 Subscriber's Name  Luann Roach

 

 Relationship  18 Self / Same As Patient

 

 Effective Date  18







Advance Directives







 Directive  Response  Recorded Date/Time

 

 Does the patient have an advance directive?  No  18 12:18pm

 

 If yes, is advance directive on file with [facility]?  No  18 12:18pm

 

 If not on file with [facility] will patient provide a copy?  No  18 12:18pm

 

 Do you have a Directive to Physician?  No  18 5:00am

 

 Do you have a Medical Power of ?  No  18 5:00am

 

 Do you have an out of hospital Do Not Resuscitate Order?  No  18 5:00am

 

 Do you have any special needs we should be aware of?  No  18 5:00am

 

 Do you have a support person here with you today?  Yes  18 5:00am

 

 Did patient receive Notice of Privacy Practices?  Yes  18 5:00am

 

 Did patient receive patient rights and responsibilities?  Yes  18 5:00am







Problems







 Medical Problem  Onset Date  Status

 

 A-fib  Unknown   

 

 Head injury  Unknown   

 

 Stroke  Unknown   







Medications





Current Home Medications





 Medication  Dose  Units  Route  Directions  Days  Qty  Instructions  Start Date

 

 Benzonatate 100 Mg Capsule  100  Mg  Oral  Every 6 Hours as needed for Cough  
          

 

 Lisinopril (Prinavil / Zestril) 20 Mg Tablet  20  Mg  Oral  Daily            

 

 Meloxicam 7.5 Mg Tablet  7.5  Mg  Oral  Twice A Day as needed for Pain     30 
Tab      

 

 Metoprolol Succinate 50 Mg Tab.er.24h  50  Mg  Oral  Bedtime            

 

 Nifedipine (Nifedipine Er) 30 Mg Tab.er.24  30  Mg  Oral  Daily            

 

 Omeprazole 40 Mg Capsule.dr  40  Mg  Oral  Daily            

 

 Sertraline Hcl 50 Mg Tablet  50  Mg  Oral  Daily     30 Tab      

 

 Tolterodine Tartrate (Detrol La) 4 Mg Cap.er.24h  1  Mg  Oral  Twice A Day     
30 Cap      









Past Home Medications





 Medication  Directions  Ordered  Status

 

 Meloxicam 7.5 Mg Tablet, 7.5 Mg Oral  Daily     Discontinued







Social History







 Social History Problem  Response  Recorded Date/Time  Onset Date  Status

 

 Hx Psychiatric Problems  No  2018 12:18pm  Not Applicable  Not Applicable

 

 Hx Eating Disorder  No  2018 12:18pm  Not Applicable  Not Applicable

 

 Hx Substance Use Disorder  No  2018 12:18pm  Not Applicable  Not 
Applicable

 

 Hx Depression  No  2018 12:18pm  Not Applicable  Not Applicable

 

 Hx Alcohol Use  No  2018 12:18pm  Not Applicable  Not Applicable

 

 Hx Substance Use Treatment  No  2018 12:18pm  Not Applicable  Not 
Applicable

 

 Hx Physical Abuse  No  2018 12:18pm  Not Applicable  Not Applicable







Hospital Discharge Instructions

No hospital discharge instruction information available.



Plan of Care







 Discharge Date  18 5:18pm

 

 Disposition  TRANS TO OTHER [facility] FACILITY

 

 Prescriptions  See Medication Section







Functional Status







 Query  Response  Date Recorded

 

 FUNCTIONAL STATUS  `

  2018 5:27pm

 

 Assistive Devices  Rolling Walker

  2018 11:48am

 

 Ambulation Ability  2 person assist

  2018 11:48am

 

 Toileting Ability  Total Assistance

  2018 2:18pm







Allergies, Adverse Reactions, Alerts







 Allergen  Type  Severity  Reaction  Status  Last Updated

 

 Morphine  Allergy  Unknown     Active  18







Immunizations

No immunization information available.



Vital Signs





Acute Vital Signs





 Vital  Response  Date/Time

 

 Temperature (Fahrenheit)  98.7 degrees F (97.6 - 99.5)  2018 4:14pm

 

 Pulse      

 

    Pulse Rate (adult)  101 bpm (60 - 90)  2018 4:14pm

 

 Respiratory Rate  20 bpm (12 - 24)  2018 4:14pm

 

 Blood Pressure  170/72 mm Hg  2018 4:14pm

 

 Height  5 ft 3 in  2018 5:07am

 

 Weight  180.13 lb  2018 8:03am

 

 Body Mass Index  31.9 kg/m^2  2018 8:03am







Results





Laboratory Results





 Test Name  Result  Units  Flags  Reference  Collection Date/Time  Result Date/
Time  Comments

 

 White Blood Count  10.91  x10e3/uL   H  4.8-10.8  2018 3:10pm  2018 3:19pm   

 

 Red Blood Count  4.21  x10e6/uL     3.6-5.1  2018 3:10pm  2018 3:
19pm   

 

 Hemoglobin  9.7  g/dL   L  12.0-16.0  2018 3:10pm  2018 3:19pm   

 

 Hematocrit  32.1  %   L  34.2-44.1  2018 3:10pm  2018 3:19pm   

 

 Mean Corpuscular Volume  76.2  fL  # L  81-99  2018 3:10pm  2018 3:
19pm   

 

 Mean Corpuscular Hemoglobin  23.0  pg   L  28-32  2018 3:10pm  2018 3:19pm   

 

 Mean Corpuscular Hemoglobin Concent  30.2  g/dL   L  31-35  2018 3:10pm  
2018 3:19pm   

 

 Red Cell Distribution Width  16.1  %   H  11.7-14.4  2018 3:10pm  2018 3:19pm   

 

 Platelet Count  235  x10e3/uL     140-360  2018 3:10pm  2018 3:
19pm   

 

 Neutrophils (%) (Auto)  71.2  %     38.7-80.0  2018 3:10pm  2018 3:
19pm   

 

 Lymphocytes (%) (Auto)  18.3  %     18.0-39.1  2018 3:10pm  2018 3:
19pm   

 

 Monocytes (%) (Auto)  7.2   %     4.4-11.3  2018 3:10pm  2018 3:
19pm   

 

 Eosinophils (%) (Auto)  2.5  %     0.0-6.0  2018 3:10pm  2018 3:
19pm   

 

 Basophils (%) (Auto)  0.6  %     0.0-1.0  2018 3:10pm  2018 3:19pm
   

 

 IM GRANULOCYTES %  0.2  %     0.0-1.0  2018 3:10pm  2018 3:19pm   

 

 Neutrophils # (Auto)  7.8      H  2.1-6.9  2018 3:10pm  2018 3:
19pm   

 

 Lymphocytes # (Auto)  2.0        1.0-3.2  2018 3:10pm  2018 3:19pm
   

 

 Monocytes # (Auto)  0.8        0.2-0.8  2018 3:10pm  2018 3:19pm   

 

 Eosinophils # (Auto)  0.3        0.0-0.4  2018 3:10pm  2018 3:19pm
   

 

 Basophils # (Auto)  0.1        0.0-0.1  2018 3:10pm  2018 3:19pm   

 

 Absolute Immature Granulocyte (auto  0.02  x10e3/uL     0-0.1  2018 3:
10pm  2018 3:19pm   

 

 Urine Color  YELLOW        YELLOW  2018 1:07pm  2018 1:16pm   

 

 Urine Clarity  CLOUDY      H  CLEAR  2018 1:07pm  2018 1:16pm   

 

 Urine Specific Gravity  1.025        1.010-1.025  2018 1:07pm  2018 1:16pm   

 

 Urine pH  5        5 - 7  2018 1:07pm  2018 1:16pm   

 

 Urine Leukocyte Esterase  1+      H  NEGATIVE  2018 1:07pm  2018 1:
16pm   

 

 Urine Nitrite  POSITIVE      H  NEGATIVE  2018 1:07pm  2018 1:16pm
   

 

 Urine Protein  1+      H  NEGATIVE  2018 1:07pm  2018 1:16pm   

 

 Urine Glucose (UA)  NEGATIVE        NEGATIVE  2018 1:07pm  2018 1:
16pm   

 

 Urine Ketones  NEGATIVE        NEGATIVE  2018 1:07pm  2018 1:16pm 
  

 

 Urine Urobilinogen  1  mg/dL     0.2 - 1  2018 1:07pm  2018 1:16pm
   

 

 Urine Bilirubin  2+      H  NEGATIVE  2018 1:07pm  2018 1:16pm   

 

 Urine Blood  2+      H  NEGATIVE  2018 1:07pm  2018 1:16pm   

 

 Urine WBC  21-50  /HPF   H  0-5  2018 1:07pm  2018 1:36pm   

 

 Urine RBC  11-20  /HPF   H  0-5  2018 1:07pm  2018 1:36pm   

 

 Urine Bacteria  MODERATE  /HPF   H  NONE  2018 1:07pm  2018 1:36pm
   

 

 Urine Epithelial Cells  FEW  /LPF     NONE  2018 1:07pm  2018 1:
36pm   

 

 Urine Amorphous Sediment  RARE        FEW  2018 1:07pm  2018 1:
36pm   

 

 Urine Coarse Granular Casts  1-5      H  0  2018 1:07pm  2018 1:
36pm   

 

 Sodium Level  134  mmol/L   L  136-145  2018 3:10pm  2018 3:36pm   

 

 Potassium Level  3.9  mmol/L     3.5-5.1  2018 3:10pm  2018 3:36pm
   

 

 Chloride Level  104  mmol/L       2018 3:10pm  2018 3:36pm   

 

 Carbon Dioxide Level  24  mmol/L     22-29  2018 3:10pm  2018 3:
36pm   

 

 Anion Gap  9.9  mmol/L     8-16  2018 3:10pm  2018 3:36pm   

 

 Blood Urea Nitrogen  8  mg/dL     7-2018 3:10pm  2018 3:36pm   

 

 Creatinine  0.73  mg/dL     0.57-1.11  2018 3:10pm  2018 3:36pm   

 

 BUN/Creatinine Ratio  11        6-25  2018 3:10pm  2018 3:36pm   

 

 Estimat Glomerular Filtration Rate  > 60  ML/MIN     60-  2018 3:10pm   3:36pm  Ranges were taken from the National Kidney Disease Education 

Program and the National Kid[employer] Foundation literature.







Reference ranges:



 60 or greater: Normal



 16-59 (for 3 consecutive months): Chronic kidney disease 



 15 or less: Kidney failure

 

 Glucose Level  109  mg/dL       2018 3:10pm  2018 3:36pm   

 

 Calcium Level  10.2  mg/dL     8.4-10.2  2018 3:10pm  2018 3:36pm 
  

 

 Hemoglobin A1c Percent  5.9  %     4.0-7.0  2018 7:302018 10:
47am   

 

 Total Bilirubin  0.4  mg/dL     0.2-1.2  2018 7:302018 8:04am 
  

 

 Aspartate Amino Transf (AST/SGOT)  25  IU/L     5-34  2018 7:302018 8:04am   

 

 Alanine Aminotransferase (ALT/SGPT)  28  IU/L     0-55  2018 7:30 8:04am   

 

 Total Protein  7.2  g/dL     6.5-8.1  2018 7:302018 8:04am   

 

 Albumin  3.4  g/dL   L  3.5-5.0  2018 7:302018 8:04am   

 

 Globulin  3.8  g/dL   H  2.3-3.5  2018 7:302018 8:04am   

 

 Albumin/Globulin Ratio  0.9        0.8-2.0  2018 7:30am  2018 8:
04am   

 

 Alkaline Phosphatase  129  IU/L       2018 7:30am  2018 8:
04am   

 

 Triglycerides Level  59  MG/DL     0-149  2018 7:30am  2018 10:
47am   

 

 Cholesterol Level  135  MD/DL     0-199  2018 7:30am  2018 10:47am
  Less than 200 mg/dL       Low Risk



 201 - 239 mg/dL           Borderline Risk



 240 mg/dl and greater     High Risk                        



 

 LDL Cholesterol  80  MG/DL       2018 7:30am  2018 10:47am   

 

 HDL Cholesterol  43  MG/DL     40-60  2018 7:30am  2018 10:47am   

 

 Cholesterol/HDL Ratio  3.1        3.0-3.6  2018 7:30am  2018 10:
47am   

 

 Creatine Kinase  45  IU/L       04/15/2018 2:03am  04/15/2018 4:04am   

 

 Creatine Kinase MB  1.30  ng/mL     0-5.0  04/15/2018 2:03am  04/15/2018 4:
13am   

 

 Troponin I  < 0.001  ng/mL     0-0.300  04/15/2018 2:03am  04/15/2018 4:13am   







Procedures







 Procedure  Status  Date  Provider(s)

 

 Magnetic resonance imaging of lumbar spine without contrast  Active  10/26/17  
RAMÍREZ SOUTH MD

 

 Computed tomography of abdomen with contrast  Active  11/15/17  RAMÍREZ SOUTH MD

 

 Computed tomography of brain without radiopaque contrast  Active  18  
DEVAN CHRISTOPHER MD

 

 Computed tomography of cervical spine without contrast  Active  18  DEVAN CHRISTOPHER MD

 

 Computed tomography of chest without contrast  Active  18  DEVAN CHRISTOPHER MD







Encounters







 Encounter  Location  Arrival/Admit Date  Discharge/Depart Date  Attending 
Provider

 

 Discharged Inpatient  [facility]  18 9:58am  18 
5:18pm  RAMÍREZ SOUTH MD

 

 Registered Clinic  [facility]  17 11:08am     PRINCESS HENSON MD

 

 Registered Clinic  [facility]  11/15/17 4:48pm     RAMÍREZ SOUTH MD

 

 Registered Clinic  [facility]  10/26/17 10:31am     RAMÍREZ SOUTH MD

## 2018-05-14 NOTE — HISTORY AND PHYSICAL
HISTORY OF PRESENT ILLNESS:  The patient has been residing the last few 

weeks at the [facility].  See prior admissions here 

including stay admitted April 14 with atrial fibrillation and stroke, right 

posterior cerebral artery territory. History also includes hypertension, 

baseline vascular dementia, chronic anemia, history of renal mass and 

history of spinal stenosis with degenerative disk disease of the lumbar 

spine, chronic congestive heart failure with ejection fraction 35% here in 

April.  LVH.  History has included hematuria chronically per prior 

discharge summary. 



ALLERGIES:  NO KNOWN DRUG ALLERGIES.  



Patient is not a reliable historian and denies adverse symptoms at this 

time on review of systems.  

The patient was referred for chest pain, according to her nursing home.  

The nursing home when I was called stated she was not having diaphoresis or 

nausea or dyspnea.  Patient did have a CT of her chest here last month with 

small effusion, possible pulmonary hypertension.



Last admission, she also experienced a fracture of the base of the 4th 

proximal phalanx.



PAST MEDICAL HISTORY:  She has a history of chronic left humerus fracture.  

Chronic type 2 dens fracture seen on CT cervical spine last month.  Left 

4th phalanx fracture as mentioned above.  Chronic hypertension.



REVIEW OF SYSTEMS:   Patient denies adverse symptoms, but then her history 

is not reliable.



PHYSICAL EXAMINATION 

GENERAL:  The patient is awake and conversant and slow to respond.  

Sensorium is as I have  seen it previously.  

HEENT:  Pupils react, equally.  grossly intact.  Pale.  Throat 

clear, not aspirating.  

NECK:  Flexes.  

PULMONARY:  Auscultation grossly clear.  

BREASTS:  No palpable breast mass.  

CARDIAC:  Sounds S1-2 soft.  

ABDOMEN:  Abdomen is soft and bowel sounds normal.  

EXTREMITIES:  With dampened pulses.  No DVT to examination.  Calves not 

tender.  Homans' negative.

NEUROLOGIC:  Patient is oriented to person only now.  She has had prior 

surgery to her eyes.  The patient has generalized weakness, more so on the 

left side.  Trace increased DTRs, left.  



Available data per verbal report noted.  Per EMR reviewed.  Hemoglobin is 

depressed at 9.9, white count elevated 13.1, low red cell indices, cardiac 

enzymes were negative for acute MI.  Elevated B-natriuretic peptide at 

196.  Albumin 2.3.  Globulin 5.3. Recent serum protein electrophoresis was 

reported not to show gammopathy although the patient has elevated alkaline 

phos and elevated serum calcium at 11.6.  AST high at 72.  ALT high at 73.  

Recent viral studies for hepatitis have been negative and low-dose statin 

was recently held for similar transaminase changes.  Recent ultrasound 

outpatient abdomen had revealed a left renal abnormality - 3.8 cm lesion;(record

indicates she has had this previously).  Prior discussion with family 

indicated she was lost to follow up per her urology consultants.



CURRENT IMPRESSION:  

1. Chest pain.  Initial enzymes and EKG are benign.

2. Atherosclerotic cardiovascular disease with history of intermittent 

atrial fibrillation and recent stroke last month.  Left hemiparesis and 

reduced sensorium. 

3. Advanced stage of 88.  

4. Malignant hypertension.

5. Hypercalcemia. P.T.H. 34 (WNL 15-65) May 1,2018. 4-23-18 Calcium 10.7. 5-1 
Calcium 10.8.

6. Elevated transaminases.  Viral Hepatitis A,B, C negative 5-1-18.

7. Left ventricular hypertrophy.  

8. Congestive heart failure.  

9. History of spinal stenosis and degenerative disk disease with 

osteoarthritis.

10. History of vascular dementia, worsening prior to her recent stroke.

11. Chronic anemia,  as mentioned.

12. Recent lower GI bleed. Rec. GI evaluation also.  Anticoagulants held.



Patient is not ambulatory.  She has been confined to bed and wheelchair.  

She has not responded significantly to nursing home physical therapy.



Abdomen CT here November 2017 with 4.6 cm mass upper pole, left kidney, 

consistent with renal cell carcinoma.  Urology consultation was recommended 

at that time.



To follow up enzymes.  O2 saturations have been normal at 98%.



To address other medical problems while here as able.  See also initial and 

followup orders.  



See Nursing Home Med List.





  





DD:  05/14/2018 18:00

DT:  05/14/2018 18:43

Job#:  [id number] 



[id number]

## 2018-05-14 NOTE — DIAGNOSTIC IMAGING REPORT
BONE SURVEY COMPLETE



HISTORY:  Renal mass, hypercalcemia



COMPARISON: None

     

FINDINGS:

Bones:

Diffuse demineralization.

Subacute fracture of the proximal fourth left phalanx without intra-articular

component. Chronic deformity of the proximal left fifth phalanx

No displaced fracture.   No evidence of lytic or blastic lesions.

Osseous alignment is within normal limits.

Old posttraumatic deformity of the proximal left humerus at the humeral head

and proximal diaphysis. Old posttraumatic deformity of the distal right radial

metaphysis

Patient is status post total right knee and left hip arthroplasty. There is

evidence of lucency around the femoral and acetabular components of the left

hip arthroplasty suggestive of loosening



Joints:

Degenerative changes noted at the cervical, thoracic and lumbar spine.

Moderate degenerative changes of the left knee with evidence of calcification

along the menisci in keeping with CPPD arthropathy



Soft tissues:

The soft tissues appear unremarkable.





IMPRESSION: 

1.  Subacute fracture of the proximal fourth left phalanx.

2.  Left hip arthroplasty with questionable loosening.

3.  Findings in the left knee compatible with CPPD arthropathy.

4.  Degenerative and demineralization changes as described above.



Signed by: Dr. Kristian Jaquez M.D. on 5/14/2018 9:48 PM

## 2018-05-15 VITALS — SYSTOLIC BLOOD PRESSURE: 193 MMHG | DIASTOLIC BLOOD PRESSURE: 81 MMHG

## 2018-05-15 VITALS — SYSTOLIC BLOOD PRESSURE: 149 MMHG | DIASTOLIC BLOOD PRESSURE: 63 MMHG

## 2018-05-15 VITALS — DIASTOLIC BLOOD PRESSURE: 63 MMHG | SYSTOLIC BLOOD PRESSURE: 149 MMHG

## 2018-05-15 VITALS — SYSTOLIC BLOOD PRESSURE: 168 MMHG | DIASTOLIC BLOOD PRESSURE: 79 MMHG

## 2018-05-15 VITALS — DIASTOLIC BLOOD PRESSURE: 60 MMHG | SYSTOLIC BLOOD PRESSURE: 184 MMHG

## 2018-05-15 VITALS — DIASTOLIC BLOOD PRESSURE: 65 MMHG | SYSTOLIC BLOOD PRESSURE: 167 MMHG

## 2018-05-15 VITALS — SYSTOLIC BLOOD PRESSURE: 188 MMHG | DIASTOLIC BLOOD PRESSURE: 95 MMHG

## 2018-05-15 VITALS — DIASTOLIC BLOOD PRESSURE: 56 MMHG | SYSTOLIC BLOOD PRESSURE: 123 MMHG

## 2018-05-15 VITALS — DIASTOLIC BLOOD PRESSURE: 84 MMHG | SYSTOLIC BLOOD PRESSURE: 159 MMHG

## 2018-05-15 VITALS — DIASTOLIC BLOOD PRESSURE: 95 MMHG | SYSTOLIC BLOOD PRESSURE: 188 MMHG

## 2018-05-15 LAB
ALBUMIN SERPL-MCNC: 2.2 G/DL (ref 3.5–5)
ALBUMIN/GLOB SERPL: 0.5 {RATIO} (ref 0.8–2)
ALP SERPL-CCNC: 171 IU/L (ref 40–150)
ALT SERPL-CCNC: 52 IU/L (ref 0–55)
ANION GAP SERPL CALC-SCNC: 10.5 MMOL/L (ref 8–16)
ANION GAP SERPL CALC-SCNC: 11.6 MMOL/L (ref 8–16)
BASOPHILS # BLD AUTO: 0 10*3/UL (ref 0–0.1)
BASOPHILS NFR BLD AUTO: 0.4 % (ref 0–1)
BUN SERPL-MCNC: 13 MG/DL (ref 7–26)
BUN SERPL-MCNC: 14 MG/DL (ref 7–26)
BUN/CREAT SERPL: 22 (ref 6–25)
BUN/CREAT SERPL: 23 (ref 6–25)
CALCIUM SERPL-MCNC: 11.3 MG/DL (ref 8.4–10.2)
CALCIUM SERPL-MCNC: 11.3 MG/DL (ref 8.4–10.2)
CHLORIDE SERPL-SCNC: 106 MMOL/L (ref 98–107)
CHLORIDE SERPL-SCNC: 107 MMOL/L (ref 98–107)
CK MB SERPL-MCNC: 0.5 NG/ML (ref 0–5)
CK MB SERPL-MCNC: 0.6 NG/ML (ref 0–5)
CK SERPL-CCNC: < 7 IU/L (ref 29–168)
CK SERPL-CCNC: < 7 IU/L (ref 29–168)
CO2 SERPL-SCNC: 22 MMOL/L (ref 22–29)
CO2 SERPL-SCNC: 24 MMOL/L (ref 22–29)
DEPRECATED NEUTROPHILS # BLD AUTO: 7.4 10*3/UL (ref 2.1–6.9)
EGFRCR SERPLBLD CKD-EPI 2021: > 60 ML/MIN (ref 60–?)
EGFRCR SERPLBLD CKD-EPI 2021: > 60 ML/MIN (ref 60–?)
EOSINOPHIL # BLD AUTO: 0.2 10*3/UL (ref 0–0.4)
EOSINOPHIL NFR BLD AUTO: 2.3 % (ref 0–6)
ERYTHROCYTE [DISTWIDTH] IN CORD BLOOD: 16.3 % (ref 11.7–14.4)
GLOBULIN PLAS-MCNC: 4.4 G/DL (ref 2.3–3.5)
GLUCOSE SERPLBLD-MCNC: 109 MG/DL (ref 74–118)
GLUCOSE SERPLBLD-MCNC: 110 MG/DL (ref 74–118)
HCT VFR BLD AUTO: 30.4 % (ref 34.2–44.1)
HGB BLD-MCNC: 9.2 G/DL (ref 12–16)
LYMPHOCYTES # BLD: 1.5 10*3/UL (ref 1–3.2)
LYMPHOCYTES NFR BLD AUTO: 15.5 % (ref 18–39.1)
MCH RBC QN AUTO: 22.7 PG (ref 28–32)
MCHC RBC AUTO-ENTMCNC: 30.3 G/DL (ref 31–35)
MCV RBC AUTO: 75.1 FL (ref 81–99)
MONOCYTES # BLD AUTO: 0.6 10*3/UL (ref 0.2–0.8)
MONOCYTES NFR BLD AUTO: 6 % (ref 4.4–11.3)
NEUTS SEG NFR BLD AUTO: 75.5 % (ref 38.7–80)
PLATELET # BLD AUTO: 275 X10E3/UL (ref 140–360)
POTASSIUM SERPL-SCNC: 3.5 MMOL/L (ref 3.5–5.1)
POTASSIUM SERPL-SCNC: 3.6 MMOL/L (ref 3.5–5.1)
RBC # BLD AUTO: 4.05 X10E6/UL (ref 3.6–5.1)
SODIUM SERPL-SCNC: 136 MMOL/L (ref 136–145)
SODIUM SERPL-SCNC: 138 MMOL/L (ref 136–145)

## 2018-05-15 RX ADMIN — LISINOPRIL SCH MG: 20 TABLET ORAL at 09:56

## 2018-05-15 RX ADMIN — SODIUM CHLORIDE SCH GM: 9 INJECTION, SOLUTION INTRAVENOUS at 09:56

## 2018-05-15 RX ADMIN — TOLTERODINE TARTRATE SCH MG: 2 TABLET, FILM COATED ORAL at 09:56

## 2018-05-15 RX ADMIN — QUETIAPINE PRN MG: 25 TABLET, FILM COATED ORAL at 20:48

## 2018-05-15 RX ADMIN — SODIUM CHLORIDE SCH MLS/HR: 9 INJECTION, SOLUTION INTRAVENOUS at 09:56

## 2018-05-15 RX ADMIN — HYDRALAZINE HYDROCHLORIDE PRN MG: 20 INJECTION INTRAMUSCULAR; INTRAVENOUS at 05:07

## 2018-05-15 RX ADMIN — CARVEDILOL SCH MG: 12.5 TABLET, FILM COATED ORAL at 16:57

## 2018-05-15 RX ADMIN — SODIUM CHLORIDE SCH MLS/HR: 9 INJECTION, SOLUTION INTRAVENOUS at 20:48

## 2018-05-15 RX ADMIN — LISINOPRIL SCH MG: 20 TABLET ORAL at 16:57

## 2018-05-15 RX ADMIN — SODIUM CHLORIDE SCH GM: 9 INJECTION, SOLUTION INTRAVENOUS at 20:48

## 2018-05-15 RX ADMIN — HYDRALAZINE HYDROCHLORIDE PRN MG: 20 INJECTION INTRAMUSCULAR; INTRAVENOUS at 05:04

## 2018-05-15 RX ADMIN — HYDRALAZINE HYDROCHLORIDE PRN MG: 20 INJECTION INTRAMUSCULAR; INTRAVENOUS at 04:38

## 2018-05-15 RX ADMIN — TOLTERODINE TARTRATE SCH MG: 2 TABLET, FILM COATED ORAL at 16:57

## 2018-05-15 RX ADMIN — HYDRALAZINE HYDROCHLORIDE PRN MG: 20 INJECTION INTRAMUSCULAR; INTRAVENOUS at 15:55

## 2018-05-15 RX ADMIN — CLONIDINE SCH EA: 0.2 PATCH TRANSDERMAL at 09:56

## 2018-05-15 NOTE — DIAGNOSTIC IMAGING REPORT
PROCEDURE:US RETROPERITONEAL ( KIDNEY ).

COMPARISON:Patients [facility], CT, CT ABDOMEN W, 11/15/2017, 

17:55.

INDICATIONS:COMPARE MASS TO PRIOR CT

TECHNIQUE: Grey-scale and color sonographic images of the bilateral 

kidneys and bladder where obtained in transverse and longitudinal 

planes.

 

FINDINGS: Exam limited due to patient weakness,, inability to change 

positions and overlying bowel gas..

 

RIGHT KIDNEY: 10.6 cm, cortex 2.1 cm

Cysts: None

Solid masses: None

Stones: None

Hydronephrosis: None

Echogenicity: Increased

 

LEFT KIDNEY: 9.9 cm, cortex 1.6 cm

Cysts: None

Solid masses: 4.6 x 4.1 x 3.8 cm irregular shaped hypoechoic mass in 

the superior and mid aspect, which has internal vascularity, consistent 

with previously visualized RCC (previously measured approximately 4.1 x 

3.4 x 4.6 cm on CT).

Stones: None

Hydronephrosis: None

Echogenicity: Increased

 

Bladder: No focal lesion. Bilateral ureteral jets are identified.

 

CONCLUSION:

1. No significant interval change in size of hypoechoic mass in the 

superior mid aspect of the left kidney, consistent with previously 

visualized RCC, when accounting for differences in modality.

2. Increased renal cortical echogenicity, consistent with medical renal 

disease.

 

 

 

Michael Terrazas M.D.  

Dictated by:  Michael Terrazas M.D. on 5/15/2018 at 20:01     

Electronically approved by:  Michael Terrazas M.D. on 5/15/2018 at 

20:01

## 2018-05-15 NOTE — HISTORY AND PHYSICAL
ADDENDUM TO HISTORY AND PHYSICAL



Further history obtained.  



PAST MEDICAL HISTORY:  Included also hyperlipidemia and diabetes type 2 not 

on medicine. 

PAST SURGICAL HISTORY:  Right knee surgery.  Right hip replacement.  

Bilateral cataracts.  



The patient saw Dr. Hanley, cardiology consultant when she was here last 

month.  



History has included 7 child births, multiple falls.  



FAMILY HISTORY:  Includes coronary artery disease, "brain cancer", 

hypertension, hyperlipidemia, diabetes.  



The patient has a 5th grade education.  No prior known use of alcohol or 

tobacco.  





  





DD:  05/14/2018 18:05

DT:  05/14/2018 18:39

Job#:  [id number]

## 2018-05-15 NOTE — CONSULTATION
DATE OF CONSULTATION:  May 15, 2018 



CARDIOLOGY CONSULTATION 



REQUESTING PHYSICIAN:  Dr. Miquel Lomeli



REASON FOR CONSULTATION:  Chest pain.



HISTORY OF PRESENT ILLNESS:  This is an 88-year-old woman with a history of 

recent posterior cerebral artery CVA, hypertension, hyperlipidemia, atrial 

fibrillation not on anticoagulation, and diabetes mellitus, who presented 

with complaint of chest pain. The patient was recently discharged from 

[facility] after her acute CVA.  She was residing at [facility] when she complained of chest pain.  The pain was 

associated with shortness of breath, nausea and diaphoresis.  It lasted 

approximately 30 minutes.  There was no radiation or aggravating or 

alleviating factors.  Family indicates the patient has not progressed with 

physical therapy after her discharge.  She remains bedbound and 

intermittently confused, often talking with  family members.  



REVIEW OF SYSTEMS:   Negative, except as per HPI.



PAST MEDICAL HISTORY

1. Recent right posterior cerebral artery CVA.

2. Hypertension.

3. Hyperlipidemia.

4. Diabetes mellitus.

5. Atrial fibrillation not on anticoagulation due to fall risk.

6. Elevated RVSP by echocardiogram.

7. Chronic type-2 dens fracture.

8. Chronic left humerus fracture.

9. Recent left 4th phalanx fracture.  

10. History of renal mass.  



PAST SURGICAL HISTORY

1. Right knee surgery.

2. Right hip surgery.

3. Bilateral cataract surgery. 



ALLERGIES:  PLEASE SEE EMR.



MEDICATIONS:  Please see medication list.



SOCIAL HISTORY:  No tobacco, alcohol, or illicit drugs.



FAMILY HISTORY:  Noncontributory.



PHYSICAL EXAMINATION

VITAL SIGNS:  Temperature 98.7 degrees, pulse 75, respiratory rate 16, 

blood pressure 159/84, oxygen saturation 95% on room air. 

GENERAL:  Elderly woman, well developed, well nourished in no acute 

distress.

HEENT:  Normocephalic and atraumatic.  Pupils are equal.  No scleral 

icterus.

NECK:  Supple.  No thyromegaly or cervical lymphadenopathy.  No carotid 

bruit. 

LUNGS:  Clear to auscultation bilaterally.  No wheezes or crackles.  

CARDIOVASCULAR:  Normal rate, regular rhythm.  No murmur.  Normal S1 and 

S2.  

ABDOMEN:  Soft.  Nontender. 

EXTREMITIES:  No edema.  



LABS:  WBC 9.84, hemoglobin 9.2, hematocrit 30.4, platelets 275.  Sodium 

136, potassium 3.6, chloride 106, CO2 22, BUN 13, creatinine 0.6.  Troponin 

less than 0.001.  .  



EKG:  Normal sinus rhythm, right bundle-branch block.



IMPRESSION 

1. Chest pain.  

2. Paroxysmal atrial fibrillation.  Not on anticoagulation due to 

frequent falls.

3. Recent right posterior cerebral artery cerebrovascular accident with 

resulting left hemiparesis.

4. Hypertension.

5. Hyperlipidemia.

6. Diabetes mellitus.

7. Elevated right ventricular systolic pressure by echocardiogram.

8. Left renal mass.  



RECOMMENDATIONS:  There has been no evidence of myocardial infarction on 

serial cardiac biomarkers.  Will start the patient on aspirin.  Given the 

patient's bedbound status and poor response to physical therapy as well as 

her comorbid conditions, recommend conservative medical therapy.  This was 

discussed with the patient's family, who agreed with the plan of care.  If 

she has recurrent chest pain, consider ischemic evaluation at that time.  

In the meantime, we will increase the patient's cardiac medicines. 



Thank you for this consult.  We will continue to follow. 



 



DD:  05/15/2018 12:56

DT:  05/15/2018 13:17

Job#:  [id number]

## 2018-05-16 VITALS — SYSTOLIC BLOOD PRESSURE: 168 MMHG | DIASTOLIC BLOOD PRESSURE: 70 MMHG

## 2018-05-16 VITALS — SYSTOLIC BLOOD PRESSURE: 159 MMHG | DIASTOLIC BLOOD PRESSURE: 70 MMHG

## 2018-05-16 VITALS — SYSTOLIC BLOOD PRESSURE: 131 MMHG | DIASTOLIC BLOOD PRESSURE: 55 MMHG

## 2018-05-16 VITALS — DIASTOLIC BLOOD PRESSURE: 77 MMHG | SYSTOLIC BLOOD PRESSURE: 179 MMHG

## 2018-05-16 VITALS — DIASTOLIC BLOOD PRESSURE: 70 MMHG | SYSTOLIC BLOOD PRESSURE: 169 MMHG

## 2018-05-16 VITALS — DIASTOLIC BLOOD PRESSURE: 56 MMHG | SYSTOLIC BLOOD PRESSURE: 120 MMHG

## 2018-05-16 VITALS — DIASTOLIC BLOOD PRESSURE: 55 MMHG | SYSTOLIC BLOOD PRESSURE: 131 MMHG

## 2018-05-16 VITALS — DIASTOLIC BLOOD PRESSURE: 70 MMHG | SYSTOLIC BLOOD PRESSURE: 168 MMHG

## 2018-05-16 LAB
ALBUMIN SERPL-MCNC: 2 G/DL (ref 3.5–5)
ALBUMIN/GLOB SERPL: 0.5 {RATIO} (ref 0.8–2)
ALP SERPL-CCNC: 147 IU/L (ref 40–150)
ALT SERPL-CCNC: 38 IU/L (ref 0–55)
ANION GAP SERPL CALC-SCNC: 10.6 MMOL/L (ref 8–16)
BASOPHILS # BLD AUTO: 0.1 10*3/UL (ref 0–0.1)
BASOPHILS NFR BLD AUTO: 0.6 % (ref 0–1)
BUN SERPL-MCNC: 13 MG/DL (ref 7–26)
BUN/CREAT SERPL: 22 (ref 6–25)
CALCIUM SERPL-MCNC: 10.7 MG/DL (ref 8.4–10.2)
CHLORIDE SERPL-SCNC: 112 MMOL/L (ref 98–107)
CO2 SERPL-SCNC: 21 MMOL/L (ref 22–29)
DEPRECATED NEUTROPHILS # BLD AUTO: 5.7 10*3/UL (ref 2.1–6.9)
DEPRECATED PHOSPHATE SERPL-MCNC: 3 MG/DL (ref 2.3–4.7)
EGFRCR SERPLBLD CKD-EPI 2021: > 60 ML/MIN (ref 60–?)
EOSINOPHIL # BLD AUTO: 0.3 10*3/UL (ref 0–0.4)
EOSINOPHIL NFR BLD AUTO: 3.5 % (ref 0–6)
ERYTHROCYTE [DISTWIDTH] IN CORD BLOOD: 16.8 % (ref 11.7–14.4)
FOLATE SERPL-MCNC: 8.3 NG/ML (ref 7–15.4)
GLOBULIN PLAS-MCNC: 4 G/DL (ref 2.3–3.5)
GLUCOSE SERPLBLD-MCNC: 94 MG/DL (ref 74–118)
HCT VFR BLD AUTO: 28 % (ref 34.2–44.1)
HGB BLD-MCNC: 8.4 G/DL (ref 12–16)
LYMPHOCYTES # BLD: 1.4 10*3/UL (ref 1–3.2)
LYMPHOCYTES NFR BLD AUTO: 17.3 % (ref 18–39.1)
MAGNESIUM SERPL-MCNC: 1.4 MG/DL (ref 1.3–2.1)
MCH RBC QN AUTO: 22.6 PG (ref 28–32)
MCHC RBC AUTO-ENTMCNC: 30 G/DL (ref 31–35)
MCV RBC AUTO: 75.3 FL (ref 81–99)
MONOCYTES # BLD AUTO: 0.5 10*3/UL (ref 0.2–0.8)
MONOCYTES NFR BLD AUTO: 6.5 % (ref 4.4–11.3)
NEUTS SEG NFR BLD AUTO: 71.3 % (ref 38.7–80)
PLATELET # BLD AUTO: 248 X10E3/UL (ref 140–360)
POTASSIUM SERPL-SCNC: 3.6 MMOL/L (ref 3.5–5.1)
RBC # BLD AUTO: 3.72 X10E6/UL (ref 3.6–5.1)
SODIUM SERPL-SCNC: 140 MMOL/L (ref 136–145)
T4 FREE SERPL-MCNC: 1.16 NG/DL (ref 0.9–1.8)
TSH SERPL DL<=0.005 MIU/L-ACNC: 2.23 UIU/ML (ref 0.35–4.94)
TSH SERPL DL<=0.005 MIU/L-ACNC: 2.86 UIU/ML (ref 0.35–4.94)
VIT B12 BLD-MCNC: 301 PG/ML (ref 213–816)

## 2018-05-16 RX ADMIN — CARVEDILOL SCH MG: 12.5 TABLET, FILM COATED ORAL at 08:56

## 2018-05-16 RX ADMIN — TOLTERODINE TARTRATE SCH MG: 2 TABLET, FILM COATED ORAL at 17:06

## 2018-05-16 RX ADMIN — LISINOPRIL SCH MG: 20 TABLET ORAL at 08:57

## 2018-05-16 RX ADMIN — ASPIRIN SCH MG: 81 TABLET, COATED ORAL at 08:56

## 2018-05-16 RX ADMIN — QUETIAPINE PRN MG: 25 TABLET, FILM COATED ORAL at 20:39

## 2018-05-16 RX ADMIN — SODIUM CHLORIDE SCH GM: 9 INJECTION, SOLUTION INTRAVENOUS at 08:57

## 2018-05-16 RX ADMIN — SODIUM CHLORIDE SCH MLS/HR: 9 INJECTION, SOLUTION INTRAVENOUS at 06:17

## 2018-05-16 RX ADMIN — TOLTERODINE TARTRATE SCH MG: 2 TABLET, FILM COATED ORAL at 08:56

## 2018-05-16 RX ADMIN — CARVEDILOL SCH MG: 12.5 TABLET, FILM COATED ORAL at 17:06

## 2018-05-16 RX ADMIN — Medication PRN MG: at 19:23

## 2018-05-16 RX ADMIN — LISINOPRIL SCH MG: 20 TABLET ORAL at 17:06

## 2018-05-16 RX ADMIN — SODIUM CHLORIDE SCH GM: 9 INJECTION, SOLUTION INTRAVENOUS at 20:27

## 2018-05-16 NOTE — PROGRESS NOTE
DATE:  May 16, 2018 



CARDIOLOGY PROGRESS NOTE



SUBJECTIVE:  The patient denies chest pain or shortness of breath.  



OBJECTIVE

VITAL SIGNS:  Temperature 99.4 degrees, pulse 81, respiratory rate 19, 

blood pressure 139/70, oxygen saturation 98% on room air.

GENERAL:  Awake, alert, in no acute distress. 

LUNGS:  Clear to auscultation bilaterally.  No wheezes or crackles. 

CARDIOVASCULAR:  Normal rate, regular rhythm.  No murmur.  Normal S1, S2.

ABDOMEN:  Soft, nontender.

EXTREMITIES:  No edema.



CARDIAC MEDICATIONS

1. Carvedilol 25 mg p.o. b.i.d.

2. Lisinopril 20 mg p.o. b.i.d.

3. Nifedipine 30 mg p.o. daily.

4. Aspirin 81 mg p.o. daily.



LABS:  WBC 8, hemoglobin 8.4, hematocrit 28, platelets 248,000.  Sodium 

140, potassium 3.6, chloride 112, CO2 21, BUN 13, creatinine 0.6.



TELEMETRY:  Normal sinus rhythm.



IMPRESSION

1. Chest pain.

2. Paroxysmal atrial fibrillation, not on anticoagulation due to 

frequent falls.

3. Recent right posterior cerebral artery cerebrovascular accident with 

resulting left hemiparesis.

4. Hypertension.

5. Hyperlipidemia.

6. Diabetes mellitus.

7. Elevated right ventricular systolic pressure by echocardiogram.

8. Left renal mass.



RECOMMENDATIONS:  There was no evidence of myocardial infarction on serial 

cardiac biomarkers.  Given the patient's bedbound status and poor response 

to physical therapy as well as comorbid conditions, recommend conservative 

medical therapy.  This was discussed with the patient's family again which 

agrees with the plan of care.  We will increase nifedipine given the 

patient's blood pressure remains elevated. 



Thank you for this consult.  We will continue to follow.









DD:  05/16/2018 20:20

DT:  05/16/2018 22:26

Job#:  [id number] [id number]

## 2018-05-16 NOTE — DIAGNOSTIC IMAGING REPORT
PROCEDURE:MODIFIED BA. SWALLOW

 

COMPARISON:None.

 

INDICATIONS:DYSPHAGIA

 

DISCUSSION: Fluoroscopic examination was performed in conjunction with 

speech pathology during swallowing of a variety of thin and thick 

liquid consistencies.

 

RADIATION DOSE:

          Total Time:  1.2

          Cumulative area dose product:  0.834  Gycm\S\2

          Cumulative air kerma:  8.376  mGy

 

FINDINGS:

 

PREMATURE SPILLAGE: 

          Over the base of the tongue: None.

          To vallecula:  With mixed texture

          To pyriform sinus: None.

 

LARYNGEAL PENETRATION: With cup sip thin only

ASPIRATION: Trace aspiration with cup sip thin

 

RESIDUE:  

         VALLECULA: Mild with thick pure, which clears with thin rims

         PYRIFORM SINUS: None

         PHARYNGEAL WALL: None

         BASE OF TONGUE: With the pure, which clears with thin rents

 

 

CONCLUSION:Penetration with thin liquid. Please see report from speech 

pathology for complete details.

 

 

 

Dictated by:  Lamin Awad M.D. on 5/16/2018 at 14:34     

Electronically approved by:  Lamin Awad M.D. on 5/16/2018 at 14:34

## 2018-05-16 NOTE — CONSULTATION
DATE OF CONSULTATION:  May 16, 2018 



ENDOCRINE CONSULTATION



This is a patient of Dr. Lomeli and Dr. Vela.  



This is an 88-year-old white female who is referred to me for evaluation of 

hypercalcemia.  Patient came to the hospital because of CVA with 

right-sided weakness.  On further evaluation, the patient was found to be 

hypercalcemic.  Her PTH levels are within the range.  She does have 

multiple other medical problems including history of chronic atrial 

fibrillation, accelerated hypertension, congestive cardiac failure and 

dementia.  She also has a history of a fall recently.  No history of kidney 

stones.  She does have a history of osteoporosis.  Patient is on several 

medications presently including hydralazine, lisinopril, nifedipine and 

clonidine.   



PHYSICAL EXAMINATION

GENERAL:  Today the patient is awake.  

VITALS:  Heart rate is around 78.  Blood pressure 130/80 mmHg. 

HEENT:  Essentially unremarkable.  Thyroid is barely palpable.  Clinically, 

she is near euthyroid. 

CHEST:  Bilateral vesicular breathing.  She has mild bronchospasm.  

CARDIAC:  First and 2nd heart sounds.  There is no 3rd or 4th heart sound.  

Ejection systolic murmur, grade 2/6.  

EXTREMITIES:  Patient has weakness on the left upper and lower extremities.



CLINICAL IMPRESSION

1. Hypercalcemia, rule out hyperparathyroidism, rule out 

malignancy-induced hypercalcemia.

2. Status post cerebrovascular accident.

3. Hypertension.  

4. Chronic atrial fibrillation.  

5. Dysphagia. 



The plan at this time is to do PTH peptide, repeat the serum PTH level and 

do an ionized calcium.  After this evaluation, we will see whether the 

patient needs to be on Aredia to bring down the calcium levels.  In the 

meantime, continue the IV fluids.  



Thanks for referring this patient.  I will be following this patient with 

you.



 





DD:  05/16/2018 13:59

DT:  05/16/2018 15:42

Job#:  [id number]

## 2018-05-17 VITALS — DIASTOLIC BLOOD PRESSURE: 56 MMHG | SYSTOLIC BLOOD PRESSURE: 143 MMHG

## 2018-05-17 VITALS — SYSTOLIC BLOOD PRESSURE: 113 MMHG | DIASTOLIC BLOOD PRESSURE: 51 MMHG

## 2018-05-17 VITALS — DIASTOLIC BLOOD PRESSURE: 52 MMHG | SYSTOLIC BLOOD PRESSURE: 144 MMHG

## 2018-05-17 VITALS — DIASTOLIC BLOOD PRESSURE: 59 MMHG | SYSTOLIC BLOOD PRESSURE: 135 MMHG

## 2018-05-17 VITALS — SYSTOLIC BLOOD PRESSURE: 150 MMHG | DIASTOLIC BLOOD PRESSURE: 56 MMHG

## 2018-05-17 LAB
ALBUMIN SERPL-MCNC: 1.9 G/DL (ref 3.5–5)
ALBUMIN/GLOB SERPL: 0.5 {RATIO} (ref 0.8–2)
ALP SERPL-CCNC: 131 IU/L (ref 40–150)
ALPHA2 GLOB 24H UR ELPH-MCNC: 3 %
ALT SERPL-CCNC: 31 IU/L (ref 0–55)
ANION GAP SERPL CALC-SCNC: 9.6 MMOL/L (ref 8–16)
BASOPHILS # BLD AUTO: 0 10*3/UL (ref 0–0.1)
BASOPHILS NFR BLD AUTO: 0.4 % (ref 0–1)
BUN SERPL-MCNC: 15 MG/DL (ref 7–26)
BUN/CREAT SERPL: 22 (ref 6–25)
CALCIUM SERPL-MCNC: 10.5 MG/DL (ref 8.4–10.2)
CHLORIDE SERPL-SCNC: 111 MMOL/L (ref 98–107)
CO2 SERPL-SCNC: 21 MMOL/L (ref 22–29)
DEPRECATED NEUTROPHILS # BLD AUTO: 6.6 10*3/UL (ref 2.1–6.9)
EGFRCR SERPLBLD CKD-EPI 2021: > 60 ML/MIN (ref 60–?)
EOSINOPHIL # BLD AUTO: 0.3 10*3/UL (ref 0–0.4)
EOSINOPHIL NFR BLD AUTO: 3.2 % (ref 0–6)
ERYTHROCYTE [DISTWIDTH] IN CORD BLOOD: 16.7 % (ref 11.7–14.4)
GLOBULIN PLAS-MCNC: 3.9 G/DL (ref 2.3–3.5)
GLUCOSE SERPLBLD-MCNC: 97 MG/DL (ref 74–118)
HCT VFR BLD AUTO: 26.3 % (ref 34.2–44.1)
HGB BLD-MCNC: 7.9 G/DL (ref 12–16)
LYMPHOCYTES # BLD: 1.5 10*3/UL (ref 1–3.2)
LYMPHOCYTES NFR BLD AUTO: 16.6 % (ref 18–39.1)
MCH RBC QN AUTO: 22.5 PG (ref 28–32)
MCHC RBC AUTO-ENTMCNC: 30 G/DL (ref 31–35)
MCV RBC AUTO: 74.9 FL (ref 81–99)
MONOCYTES # BLD AUTO: 0.6 10*3/UL (ref 0.2–0.8)
MONOCYTES NFR BLD AUTO: 6.8 % (ref 4.4–11.3)
NEUTS SEG NFR BLD AUTO: 72.7 % (ref 38.7–80)
PLATELET # BLD AUTO: 249 X10E3/UL (ref 140–360)
POTASSIUM SERPL-SCNC: 3.6 MMOL/L (ref 3.5–5.1)
RBC # BLD AUTO: 3.51 X10E6/UL (ref 3.6–5.1)
SODIUM SERPL-SCNC: 138 MMOL/L (ref 136–145)

## 2018-05-17 RX ADMIN — NYSTATIN SCH GM: 100000 POWDER TOPICAL at 21:06

## 2018-05-17 RX ADMIN — Medication PRN MG: at 13:16

## 2018-05-17 RX ADMIN — CARVEDILOL SCH MG: 12.5 TABLET, FILM COATED ORAL at 10:26

## 2018-05-17 RX ADMIN — LISINOPRIL SCH MG: 20 TABLET ORAL at 16:28

## 2018-05-17 RX ADMIN — CASTOR OIL AND BALSAM, PERU SCH GM: 788; 87 OINTMENT TOPICAL at 21:05

## 2018-05-17 RX ADMIN — VANCOMYCIN HYDROCHLORIDE SCH MLS/HR: 500 INJECTION, SOLUTION INTRAVENOUS at 13:16

## 2018-05-17 RX ADMIN — SODIUM CHLORIDE SCH GM: 9 INJECTION, SOLUTION INTRAVENOUS at 10:26

## 2018-05-17 RX ADMIN — CARVEDILOL SCH MG: 12.5 TABLET, FILM COATED ORAL at 16:28

## 2018-05-17 RX ADMIN — METOCLOPRAMIDE SCH MG: 5 INJECTION, SOLUTION INTRAMUSCULAR; INTRAVENOUS at 16:28

## 2018-05-17 RX ADMIN — SODIUM CHLORIDE SCH GM: 9 INJECTION, SOLUTION INTRAVENOUS at 21:06

## 2018-05-17 RX ADMIN — METOCLOPRAMIDE SCH MG: 5 INJECTION, SOLUTION INTRAMUSCULAR; INTRAVENOUS at 13:16

## 2018-05-17 RX ADMIN — LISINOPRIL SCH MG: 20 TABLET ORAL at 10:26

## 2018-05-17 RX ADMIN — CASTOR OIL AND BALSAM, PERU SCH GM: 788; 87 OINTMENT TOPICAL at 00:31

## 2018-05-17 RX ADMIN — TOLTERODINE TARTRATE SCH MG: 2 TABLET, FILM COATED ORAL at 16:28

## 2018-05-17 RX ADMIN — CASTOR OIL AND BALSAM, PERU SCH GM: 788; 87 OINTMENT TOPICAL at 10:26

## 2018-05-17 RX ADMIN — ASPIRIN SCH MG: 81 TABLET, COATED ORAL at 10:25

## 2018-05-17 RX ADMIN — Medication PRN MG: at 10:26

## 2018-05-17 RX ADMIN — SODIUM CHLORIDE SCH MLS/HR: 9 INJECTION, SOLUTION INTRAVENOUS at 14:00

## 2018-05-17 RX ADMIN — TOLTERODINE TARTRATE SCH MG: 2 TABLET, FILM COATED ORAL at 10:26

## 2018-05-17 RX ADMIN — METOCLOPRAMIDE SCH MG: 5 INJECTION, SOLUTION INTRAMUSCULAR; INTRAVENOUS at 21:05

## 2018-05-17 RX ADMIN — Medication PRN MG: at 17:40

## 2018-05-17 NOTE — PROGRESS NOTE
DATE:  May 17, 2018 



CARDIOLOGY PROGRESS NOTE  



SUBJECTIVE:  The patient is more alert today.  She denies chest pain, but 

is complaining of pain in her feet. 



OBJECTIVE  

VITAL SIGNS:  Temperature 99.9 degrees, pulse 75, respiratory rate 16, 

blood pressure 113/51, oxygen saturation 96% on room air. 

GENERAL:  An elderly woman, awake, alert and in no acute distress.  

LUNGS:  Clear to auscultation bilaterally.  No wheezes or crackles. 

CARDIOVASCULAR:  Normal rate, regular rhythm.  No murmur.  Normal S1, S2.

ABDOMEN:  Soft, nontender.

EXTREMITIES:  No edema. Palpable distal pulses.  



CARDIAC MEDICATIONS

1. Carvedilol 25 mg p.o. b.i.d.

2. Lisinopril 20 mg p.o. b.i.d.

3. Nifedipine 60 mg p.o. daily.

4. Aspirin 81 mg p.o. daily.



LABS:  WBC 9.06, hemoglobin 7.9, hematocrit 26.3, platelets 249,000, sodium 

138, potassium 3.6, chloride 101, CO2 of 21, BUN 15, creatinine 0.67.  

Urine culture E. coli and MRSA. 



TELEMETRY:  Normal sinus rhythm.



IMPRESSION

1. Chest pain.

2. Paroxysmal atrial fibrillation, not on anticoagulation due to 

frequent falls.

3. Recent right posterior cerebral artery cerebrovascular accident with 

resulting left hemiparesis.

4. Escherichia coli and methicillin-resistant Staphylococcus aureus. 

urinary tract infection. 

5. Hypertension.

6. Hyperlipidemia.

7. Diabetes mellitus.

8. Elevated right ventricular systolic pressure by echocardiogram.

9. Left renal mass.



RECOMMENDATIONS:  There was no evidence of myocardial infarction with 

serial cardiac biomarkers.  Given the patient's bedbound status and poor 

response to physical therapy as well as comorbid conditions, recommend 

conservative medical therapy.  This was discussed with the patient's family 

who agreed with the plan of care.  The patient's blood pressure remains 

poorly controlled.  Further titrate nifedipine.  Antibiotics per primary 

service.  



Thank you for this consult.  We will continue to follow. 







DD:  05/17/2018 18:17

DT:  05/17/2018 18:18

Job#:  [id number]

## 2018-05-18 VITALS — DIASTOLIC BLOOD PRESSURE: 67 MMHG | SYSTOLIC BLOOD PRESSURE: 143 MMHG

## 2018-05-18 VITALS — SYSTOLIC BLOOD PRESSURE: 143 MMHG | DIASTOLIC BLOOD PRESSURE: 67 MMHG

## 2018-05-18 VITALS — DIASTOLIC BLOOD PRESSURE: 44 MMHG | SYSTOLIC BLOOD PRESSURE: 128 MMHG

## 2018-05-18 VITALS — SYSTOLIC BLOOD PRESSURE: 113 MMHG | DIASTOLIC BLOOD PRESSURE: 50 MMHG

## 2018-05-18 VITALS — DIASTOLIC BLOOD PRESSURE: 71 MMHG | SYSTOLIC BLOOD PRESSURE: 169 MMHG

## 2018-05-18 VITALS — SYSTOLIC BLOOD PRESSURE: 129 MMHG | DIASTOLIC BLOOD PRESSURE: 58 MMHG

## 2018-05-18 VITALS — SYSTOLIC BLOOD PRESSURE: 138 MMHG | DIASTOLIC BLOOD PRESSURE: 60 MMHG

## 2018-05-18 LAB
ANION GAP SERPL CALC-SCNC: 10.5 MMOL/L (ref 8–16)
BASOPHILS # BLD AUTO: 0 10*3/UL (ref 0–0.1)
BASOPHILS NFR BLD AUTO: 0.5 % (ref 0–1)
BUN SERPL-MCNC: 13 MG/DL (ref 7–26)
BUN/CREAT SERPL: 22 (ref 6–25)
CALCIUM SERPL-MCNC: 10.1 MG/DL (ref 8.4–10.2)
CHLORIDE SERPL-SCNC: 110 MMOL/L (ref 98–107)
CO2 SERPL-SCNC: 21 MMOL/L (ref 22–29)
DEPRECATED NEUTROPHILS # BLD AUTO: 6.2 10*3/UL (ref 2.1–6.9)
EGFRCR SERPLBLD CKD-EPI 2021: > 60 ML/MIN (ref 60–?)
EOSINOPHIL # BLD AUTO: 0.3 10*3/UL (ref 0–0.4)
EOSINOPHIL NFR BLD AUTO: 4 % (ref 0–6)
ERYTHROCYTE [DISTWIDTH] IN CORD BLOOD: 16.5 % (ref 11.7–14.4)
GLUCOSE SERPLBLD-MCNC: 104 MG/DL (ref 74–118)
HCT VFR BLD AUTO: 27.8 % (ref 34.2–44.1)
HGB BLD-MCNC: 8.2 G/DL (ref 12–16)
LYMPHOCYTES # BLD: 1.4 10*3/UL (ref 1–3.2)
LYMPHOCYTES NFR BLD AUTO: 16.7 % (ref 18–39.1)
MCH RBC QN AUTO: 22.6 PG (ref 28–32)
MCHC RBC AUTO-ENTMCNC: 29.5 G/DL (ref 31–35)
MCV RBC AUTO: 76.6 FL (ref 81–99)
MONOCYTES # BLD AUTO: 0.5 10*3/UL (ref 0.2–0.8)
MONOCYTES NFR BLD AUTO: 5.9 % (ref 4.4–11.3)
NEUTS SEG NFR BLD AUTO: 72.4 % (ref 38.7–80)
PLATELET # BLD AUTO: 254 X10E3/UL (ref 140–360)
POTASSIUM SERPL-SCNC: 3.5 MMOL/L (ref 3.5–5.1)
RBC # BLD AUTO: 3.63 X10E6/UL (ref 3.6–5.1)
SODIUM SERPL-SCNC: 138 MMOL/L (ref 136–145)

## 2018-05-18 RX ADMIN — CARVEDILOL SCH MG: 12.5 TABLET, FILM COATED ORAL at 09:00

## 2018-05-18 RX ADMIN — CARVEDILOL SCH MG: 12.5 TABLET, FILM COATED ORAL at 16:42

## 2018-05-18 RX ADMIN — NYSTATIN SCH GM: 100000 POWDER TOPICAL at 20:13

## 2018-05-18 RX ADMIN — QUETIAPINE PRN MG: 25 TABLET, FILM COATED ORAL at 00:04

## 2018-05-18 RX ADMIN — SODIUM CHLORIDE SCH MLS/HR: 9 INJECTION, SOLUTION INTRAVENOUS at 02:30

## 2018-05-18 RX ADMIN — METOCLOPRAMIDE SCH MG: 5 INJECTION, SOLUTION INTRAMUSCULAR; INTRAVENOUS at 09:12

## 2018-05-18 RX ADMIN — Medication PRN MG: at 10:35

## 2018-05-18 RX ADMIN — TOLTERODINE TARTRATE SCH MG: 2 TABLET, FILM COATED ORAL at 16:42

## 2018-05-18 RX ADMIN — VANCOMYCIN HYDROCHLORIDE SCH MLS/HR: 500 INJECTION, SOLUTION INTRAVENOUS at 01:46

## 2018-05-18 RX ADMIN — ASPIRIN SCH MG: 81 TABLET, COATED ORAL at 09:00

## 2018-05-18 RX ADMIN — METOCLOPRAMIDE SCH MG: 5 INJECTION, SOLUTION INTRAMUSCULAR; INTRAVENOUS at 16:42

## 2018-05-18 RX ADMIN — FUROSEMIDE SCH MG: 10 INJECTION, SOLUTION INTRAMUSCULAR; INTRAVENOUS at 14:25

## 2018-05-18 RX ADMIN — NYSTATIN SCH GM: 100000 POWDER TOPICAL at 09:12

## 2018-05-18 RX ADMIN — LISINOPRIL SCH MG: 20 TABLET ORAL at 09:00

## 2018-05-18 RX ADMIN — CASTOR OIL AND BALSAM, PERU SCH GM: 788; 87 OINTMENT TOPICAL at 09:12

## 2018-05-18 RX ADMIN — SODIUM CHLORIDE SCH GM: 9 INJECTION, SOLUTION INTRAVENOUS at 09:12

## 2018-05-18 RX ADMIN — METOCLOPRAMIDE SCH MG: 5 INJECTION, SOLUTION INTRAMUSCULAR; INTRAVENOUS at 20:13

## 2018-05-18 RX ADMIN — SODIUM CHLORIDE SCH GM: 9 INJECTION, SOLUTION INTRAVENOUS at 20:14

## 2018-05-18 RX ADMIN — CASTOR OIL AND BALSAM, PERU SCH GM: 788; 87 OINTMENT TOPICAL at 20:13

## 2018-05-18 RX ADMIN — LISINOPRIL SCH MG: 20 TABLET ORAL at 16:42

## 2018-05-18 RX ADMIN — QUETIAPINE PRN MG: 25 TABLET, FILM COATED ORAL at 20:14

## 2018-05-18 RX ADMIN — SODIUM CHLORIDE SCH MLS/HR: 9 INJECTION, SOLUTION INTRAVENOUS at 15:07

## 2018-05-18 RX ADMIN — HYDRALAZINE HYDROCHLORIDE PRN MG: 20 INJECTION INTRAMUSCULAR; INTRAVENOUS at 09:21

## 2018-05-18 RX ADMIN — TOLTERODINE TARTRATE SCH MG: 2 TABLET, FILM COATED ORAL at 09:00

## 2018-05-18 RX ADMIN — METOCLOPRAMIDE SCH MG: 5 INJECTION, SOLUTION INTRAMUSCULAR; INTRAVENOUS at 12:06

## 2018-05-18 RX ADMIN — MEGESTROL ACETATE SCH MG: 40 SUSPENSION ORAL at 17:53

## 2018-05-18 RX ADMIN — Medication SCH MG: at 09:00

## 2018-05-18 RX ADMIN — VANCOMYCIN HYDROCHLORIDE SCH MLS/HR: 500 INJECTION, SOLUTION INTRAVENOUS at 12:06

## 2018-05-18 NOTE — PROGRESS NOTE
DATE:  May 18, 2018 



CARDIOLOGY PROGRESS NOTE  



SUBJECTIVE:  The patient complained of chest pain today.  She denied any 

shortness of breath.  The patient is refusing her medications. 



OBJECTIVE  

VITAL SIGNS:  Temperature 98.1 degrees, pulse 74, respiratory rate 18, 

blood pressure 113/50, oxygen saturation 95%. 

GENERAL:  An elderly woman, awake, and in no acute distress.  

LUNGS:  Clear to auscultation bilaterally.  No wheezes or crackles. 

CARDIOVASCULAR:  Normal rate, regular rhythm.  No murmur.  Normal S1, S2.

ABDOMEN:  Soft, nontender.

EXTREMITIES:  No edema. 



CARDIAC MEDICATIONS

1. Carvedilol 25 mg p.o. b.i.d.

2. Lisinopril 20 mg p.o. b.i.d.

3. Furosemide 20 mg IV daily. 

4. Nifedipine 90 mg p.o. daily.

5. Aspirin 81 mg p.o. daily.



LABS:  WBC 8.2, hemoglobin 27.8, platelets 254,000, sodium 138, potassium 

3.5, chloride 110, CO2 of 21, BUN 13, creatinine 0.6. 



TELEMETRY:  Normal sinus rhythm.



IMPRESSION

1. Chest pain.

2. Paroxysmal atrial fibrillation, not on anticoagulation due to 

frequent falls.

3. Recent right posterior cerebral artery cerebrovascular accident with 

resulting left hemiparesis.

4. Escherichia coli and methicillin-resistant Staphylococcus aureus 

urinary tract infection. 

5. Hypertension.

6. Hyperlipidemia.

7. Diabetes mellitus.

8. Elevated right ventricular systolic pressure by echocardiogram.

9. Left renal mass.



RECOMMENDATIONS:  There is no evidence of myocardial infarction on serial 

cardiac biomarkers.  Given the patient's bedbound status and poor response 

to physical therapy as well as her comorbid condition, recommend 

conservative medical therapy.  The patient is refusing some of her 

medications.  Would optimally stop clonidine and add isosorbide mononitrate 

if the patient is willing to take oral medications.  Monitor for now.  

Antibiotics per primary service.  



Thank you for this consult.  We will continue to follow. 







DD:  05/18/2018 20:59

DT:  05/18/2018 21:10

Job#:  [id number]

## 2018-05-19 VITALS — SYSTOLIC BLOOD PRESSURE: 185 MMHG | DIASTOLIC BLOOD PRESSURE: 73 MMHG

## 2018-05-19 VITALS — DIASTOLIC BLOOD PRESSURE: 63 MMHG | SYSTOLIC BLOOD PRESSURE: 139 MMHG

## 2018-05-19 VITALS — DIASTOLIC BLOOD PRESSURE: 68 MMHG | SYSTOLIC BLOOD PRESSURE: 188 MMHG

## 2018-05-19 VITALS — DIASTOLIC BLOOD PRESSURE: 77 MMHG | SYSTOLIC BLOOD PRESSURE: 190 MMHG

## 2018-05-19 VITALS — DIASTOLIC BLOOD PRESSURE: 68 MMHG | SYSTOLIC BLOOD PRESSURE: 186 MMHG

## 2018-05-19 VITALS — SYSTOLIC BLOOD PRESSURE: 180 MMHG | DIASTOLIC BLOOD PRESSURE: 66 MMHG

## 2018-05-19 VITALS — SYSTOLIC BLOOD PRESSURE: 190 MMHG | DIASTOLIC BLOOD PRESSURE: 77 MMHG

## 2018-05-19 VITALS — DIASTOLIC BLOOD PRESSURE: 68 MMHG | SYSTOLIC BLOOD PRESSURE: 166 MMHG

## 2018-05-19 VITALS — DIASTOLIC BLOOD PRESSURE: 78 MMHG | SYSTOLIC BLOOD PRESSURE: 192 MMHG

## 2018-05-19 RX ADMIN — TOLTERODINE TARTRATE SCH MG: 2 TABLET, FILM COATED ORAL at 08:45

## 2018-05-19 RX ADMIN — ENALAPRILAT SCH MG: 1.25 INJECTION INTRAVENOUS at 17:39

## 2018-05-19 RX ADMIN — Medication SCH MG: at 08:45

## 2018-05-19 RX ADMIN — TOLTERODINE TARTRATE SCH MG: 2 TABLET, FILM COATED ORAL at 17:00

## 2018-05-19 RX ADMIN — ASPIRIN SCH MG: 81 TABLET, COATED ORAL at 08:44

## 2018-05-19 RX ADMIN — ENALAPRILAT SCH MG: 1.25 INJECTION INTRAVENOUS at 12:00

## 2018-05-19 RX ADMIN — HYDRALAZINE HYDROCHLORIDE PRN MG: 20 INJECTION INTRAMUSCULAR; INTRAVENOUS at 05:28

## 2018-05-19 RX ADMIN — CASTOR OIL AND BALSAM, PERU SCH GM: 788; 87 OINTMENT TOPICAL at 21:17

## 2018-05-19 RX ADMIN — METOPROLOL TARTRATE SCH MG: 1 INJECTION, SOLUTION INTRAVENOUS at 14:01

## 2018-05-19 RX ADMIN — METOCLOPRAMIDE SCH MG: 5 INJECTION, SOLUTION INTRAMUSCULAR; INTRAVENOUS at 18:30

## 2018-05-19 RX ADMIN — CARVEDILOL SCH MG: 12.5 TABLET, FILM COATED ORAL at 08:44

## 2018-05-19 RX ADMIN — QUETIAPINE PRN MG: 25 TABLET, FILM COATED ORAL at 21:18

## 2018-05-19 RX ADMIN — LISINOPRIL SCH MG: 20 TABLET ORAL at 08:45

## 2018-05-19 RX ADMIN — NYSTATIN SCH GM: 100000 POWDER TOPICAL at 08:44

## 2018-05-19 RX ADMIN — SODIUM CHLORIDE SCH MLS/HR: 9 INJECTION, SOLUTION INTRAVENOUS at 04:00

## 2018-05-19 RX ADMIN — NYSTATIN SCH GM: 100000 POWDER TOPICAL at 21:17

## 2018-05-19 RX ADMIN — METOPROLOL TARTRATE SCH MG: 1 INJECTION, SOLUTION INTRAVENOUS at 21:18

## 2018-05-19 RX ADMIN — VANCOMYCIN HYDROCHLORIDE SCH MLS/HR: 500 INJECTION, SOLUTION INTRAVENOUS at 13:05

## 2018-05-19 RX ADMIN — SODIUM CHLORIDE SCH GM: 9 INJECTION, SOLUTION INTRAVENOUS at 08:44

## 2018-05-19 RX ADMIN — Medication SCH EA: at 08:45

## 2018-05-19 RX ADMIN — METOCLOPRAMIDE SCH MG: 5 INJECTION, SOLUTION INTRAMUSCULAR; INTRAVENOUS at 17:38

## 2018-05-19 RX ADMIN — VANCOMYCIN HYDROCHLORIDE SCH MLS/HR: 500 INJECTION, SOLUTION INTRAVENOUS at 01:00

## 2018-05-19 RX ADMIN — METOCLOPRAMIDE SCH MG: 5 INJECTION, SOLUTION INTRAMUSCULAR; INTRAVENOUS at 12:00

## 2018-05-19 RX ADMIN — POTASSIUM CHLORIDE AND SODIUM CHLORIDE SCH MLS/HR: 450; 150 INJECTION, SOLUTION INTRAVENOUS at 12:00

## 2018-05-19 RX ADMIN — METOCLOPRAMIDE SCH MG: 5 INJECTION, SOLUTION INTRAMUSCULAR; INTRAVENOUS at 08:43

## 2018-05-19 RX ADMIN — CASTOR OIL AND BALSAM, PERU SCH GM: 788; 87 OINTMENT TOPICAL at 08:44

## 2018-05-19 RX ADMIN — MEGESTROL ACETATE SCH MG: 40 SUSPENSION ORAL at 08:43

## 2018-05-19 RX ADMIN — SODIUM CHLORIDE SCH GM: 9 INJECTION, SOLUTION INTRAVENOUS at 21:17

## 2018-05-19 RX ADMIN — FUROSEMIDE SCH MG: 10 INJECTION, SOLUTION INTRAMUSCULAR; INTRAVENOUS at 08:43

## 2018-05-19 RX ADMIN — HYDRALAZINE HYDROCHLORIDE PRN MG: 20 INJECTION INTRAMUSCULAR; INTRAVENOUS at 18:30

## 2018-05-19 RX ADMIN — HYDRALAZINE HYDROCHLORIDE PRN MG: 20 INJECTION INTRAMUSCULAR; INTRAVENOUS at 08:44

## 2018-05-19 RX ADMIN — Medication PRN MG: at 09:34

## 2018-05-19 NOTE — PROGRESS NOTE
DATE:  May 19, 2018 



CARDIOLOGY PROGRESS NOTE



SUBJECTIVE:  The patient denies chest pain.  However, she is refusing her 

medications.



OBJECTIVE 

VITALS:  Temperature 99.1 degrees, pulse 53, respiratory rate 17, blood 

pressure 190/77, oxygen saturation 94% on room air. 

GENERAL:  Elderly woman awake and in no acute distress. 

LUNGS:  Clear to auscultation bilaterally.  No wheezes or crackles.

CARDIOVASCULAR:  Normal rate.  Regular rhythm.  No murmur.  Normal S1 and 

S2. 

ABDOMEN:  Soft and nontender.

EXTREMITIES:  No edema.  



CARDIAC MEDICATIONS 

1.  Furosemide 20 mg IV daily.

2.  Clonidine patch every week.

3.  Metoprolol tartrate 5 mg IV q.8 h. 

4.  Enalapril 0.625 mg IV q.6 h. 



LABS:  None today.  Telemetry is normal sinus rhythm.



IMPRESSION 

1.  Chest pain.

2.  Hypertension, uncontrolled.

3.  Paroxysmal atrial fibrillation:  Not on anticoagulation due to frequent 

falls.

4.  Recent right posterior cerebral artery cerebrovascular accident with 

resulting left hemiparesis.

5.  Escherichia coli and methicillin-resistant Staphylococcus aureus 

urinary tract infection. 

6.  Hypertension. 

7.  Hyperlipidemia.

8.  Diabetes mellitus. 

9.  Elevated right ventricular systolic pressure by echocardiogram.

10. Left renal mass.



RECOMMENDATIONS:  There was no evidence of myocardial infarction on serial 

cardiac biomarkers.  Given the patient's bedbound status, poor response to 

physical therapy, medication noncompliance, and comorbid conditions, 

recommend conservative medical therapy.  The patient has repeatedly refused 

her hypertensive therapies, which is resulting on uncontrolled high blood 

pressure.  Continue clonidine.  Add intravenous labetalol.  Noted addition 

of intravenous enalapril and metoprolol as well.  Antibiotics per primary 

service.  



Thank you for this consult.  We will continue to follow. 









DD:  05/19/2018 11:42

DT:  05/19/2018 12:03

Job#:  [id number]

## 2018-05-20 VITALS — SYSTOLIC BLOOD PRESSURE: 174 MMHG | DIASTOLIC BLOOD PRESSURE: 74 MMHG

## 2018-05-20 VITALS — SYSTOLIC BLOOD PRESSURE: 130 MMHG | DIASTOLIC BLOOD PRESSURE: 58 MMHG

## 2018-05-20 VITALS — DIASTOLIC BLOOD PRESSURE: 59 MMHG | SYSTOLIC BLOOD PRESSURE: 142 MMHG

## 2018-05-20 VITALS — DIASTOLIC BLOOD PRESSURE: 66 MMHG | SYSTOLIC BLOOD PRESSURE: 179 MMHG

## 2018-05-20 VITALS — DIASTOLIC BLOOD PRESSURE: 77 MMHG | SYSTOLIC BLOOD PRESSURE: 192 MMHG

## 2018-05-20 VITALS — SYSTOLIC BLOOD PRESSURE: 171 MMHG | DIASTOLIC BLOOD PRESSURE: 75 MMHG

## 2018-05-20 VITALS — SYSTOLIC BLOOD PRESSURE: 179 MMHG | DIASTOLIC BLOOD PRESSURE: 66 MMHG

## 2018-05-20 VITALS — DIASTOLIC BLOOD PRESSURE: 66 MMHG | SYSTOLIC BLOOD PRESSURE: 141 MMHG

## 2018-05-20 LAB
ALBUMIN SERPL-MCNC: 2 G/DL (ref 3.5–5)
ALBUMIN/GLOB SERPL: 0.5 {RATIO} (ref 0.8–2)
ALP SERPL-CCNC: 127 IU/L (ref 40–150)
ALT SERPL-CCNC: 21 IU/L (ref 0–55)
ANION GAP SERPL CALC-SCNC: 12.5 MMOL/L (ref 8–16)
BASOPHILS # BLD AUTO: 0.1 10*3/UL (ref 0–0.1)
BASOPHILS NFR BLD AUTO: 0.5 % (ref 0–1)
BUN SERPL-MCNC: 11 MG/DL (ref 7–26)
BUN/CREAT SERPL: 18 (ref 6–25)
CALCIUM SERPL-MCNC: 10.7 MG/DL (ref 8.4–10.2)
CHLORIDE SERPL-SCNC: 109 MMOL/L (ref 98–107)
CO2 SERPL-SCNC: 21 MMOL/L (ref 22–29)
DEPRECATED NEUTROPHILS # BLD AUTO: 8.7 10*3/UL (ref 2.1–6.9)
DEPRECATED PHOSPHATE SERPL-MCNC: 2.6 MG/DL (ref 2.3–4.7)
EGFRCR SERPLBLD CKD-EPI 2021: > 60 ML/MIN (ref 60–?)
EOSINOPHIL # BLD AUTO: 0.2 10*3/UL (ref 0–0.4)
EOSINOPHIL NFR BLD AUTO: 1.9 % (ref 0–6)
ERYTHROCYTE [DISTWIDTH] IN CORD BLOOD: 16.6 % (ref 11.7–14.4)
GLOBULIN PLAS-MCNC: 4.3 G/DL (ref 2.3–3.5)
GLUCOSE SERPLBLD-MCNC: 85 MG/DL (ref 74–118)
HCT VFR BLD AUTO: 28.2 % (ref 34.2–44.1)
HGB BLD-MCNC: 8.4 G/DL (ref 12–16)
LYMPHOCYTES # BLD: 1.2 10*3/UL (ref 1–3.2)
LYMPHOCYTES NFR BLD AUTO: 11.1 % (ref 18–39.1)
MAGNESIUM SERPL-MCNC: 1.2 MG/DL (ref 1.3–2.1)
MCH RBC QN AUTO: 22.1 PG (ref 28–32)
MCHC RBC AUTO-ENTMCNC: 29.8 G/DL (ref 31–35)
MCV RBC AUTO: 74.2 FL (ref 81–99)
MONOCYTES # BLD AUTO: 0.7 10*3/UL (ref 0.2–0.8)
MONOCYTES NFR BLD AUTO: 6.4 % (ref 4.4–11.3)
NEUTS SEG NFR BLD AUTO: 79.6 % (ref 38.7–80)
PLATELET # BLD AUTO: 285 X10E3/UL (ref 140–360)
POTASSIUM SERPL-SCNC: 3.5 MMOL/L (ref 3.5–5.1)
RBC # BLD AUTO: 3.8 X10E6/UL (ref 3.6–5.1)
SODIUM SERPL-SCNC: 139 MMOL/L (ref 136–145)

## 2018-05-20 RX ADMIN — METOCLOPRAMIDE SCH MG: 5 INJECTION, SOLUTION INTRAMUSCULAR; INTRAVENOUS at 21:04

## 2018-05-20 RX ADMIN — VANCOMYCIN HYDROCHLORIDE SCH MLS/HR: 500 INJECTION, SOLUTION INTRAVENOUS at 01:14

## 2018-05-20 RX ADMIN — POTASSIUM CHLORIDE AND SODIUM CHLORIDE SCH MLS/HR: 450; 150 INJECTION, SOLUTION INTRAVENOUS at 01:20

## 2018-05-20 RX ADMIN — FUROSEMIDE SCH MG: 10 INJECTION, SOLUTION INTRAMUSCULAR; INTRAVENOUS at 08:57

## 2018-05-20 RX ADMIN — TOLTERODINE TARTRATE SCH MG: 2 TABLET, FILM COATED ORAL at 08:57

## 2018-05-20 RX ADMIN — VANCOMYCIN HYDROCHLORIDE SCH MLS/HR: 500 INJECTION, SOLUTION INTRAVENOUS at 13:18

## 2018-05-20 RX ADMIN — SODIUM CHLORIDE SCH GM: 9 INJECTION, SOLUTION INTRAVENOUS at 21:04

## 2018-05-20 RX ADMIN — HYDRALAZINE HYDROCHLORIDE PRN MG: 20 INJECTION INTRAMUSCULAR; INTRAVENOUS at 08:57

## 2018-05-20 RX ADMIN — Medication PRN MG: at 13:18

## 2018-05-20 RX ADMIN — ENALAPRILAT SCH MG: 1.25 INJECTION INTRAVENOUS at 00:17

## 2018-05-20 RX ADMIN — ENALAPRILAT SCH MG: 1.25 INJECTION INTRAVENOUS at 06:29

## 2018-05-20 RX ADMIN — Medication PRN MG: at 08:54

## 2018-05-20 RX ADMIN — NYSTATIN SCH GM: 100000 POWDER TOPICAL at 21:04

## 2018-05-20 RX ADMIN — MEGESTROL ACETATE SCH MG: 40 SUSPENSION ORAL at 08:57

## 2018-05-20 RX ADMIN — CASTOR OIL AND BALSAM, PERU SCH GM: 788; 87 OINTMENT TOPICAL at 21:04

## 2018-05-20 RX ADMIN — CASTOR OIL AND BALSAM, PERU SCH GM: 788; 87 OINTMENT TOPICAL at 08:57

## 2018-05-20 RX ADMIN — METOCLOPRAMIDE SCH MG: 5 INJECTION, SOLUTION INTRAMUSCULAR; INTRAVENOUS at 08:57

## 2018-05-20 RX ADMIN — Medication SCH EA: at 08:57

## 2018-05-20 RX ADMIN — METOPROLOL TARTRATE SCH MG: 1 INJECTION, SOLUTION INTRAVENOUS at 06:28

## 2018-05-20 RX ADMIN — METOPROLOL TARTRATE SCH MG: 1 INJECTION, SOLUTION INTRAVENOUS at 21:04

## 2018-05-20 RX ADMIN — Medication PRN MG: at 04:48

## 2018-05-20 RX ADMIN — METOCLOPRAMIDE SCH MG: 5 INJECTION, SOLUTION INTRAMUSCULAR; INTRAVENOUS at 11:44

## 2018-05-20 RX ADMIN — ENALAPRILAT SCH MG: 1.25 INJECTION INTRAVENOUS at 11:44

## 2018-05-20 RX ADMIN — METOCLOPRAMIDE SCH MG: 5 INJECTION, SOLUTION INTRAMUSCULAR; INTRAVENOUS at 16:57

## 2018-05-20 RX ADMIN — TOLTERODINE TARTRATE SCH MG: 2 TABLET, FILM COATED ORAL at 16:49

## 2018-05-20 RX ADMIN — SODIUM CHLORIDE SCH GM: 9 INJECTION, SOLUTION INTRAVENOUS at 08:57

## 2018-05-20 RX ADMIN — METOPROLOL TARTRATE SCH MG: 1 INJECTION, SOLUTION INTRAVENOUS at 14:16

## 2018-05-20 RX ADMIN — POTASSIUM CHLORIDE AND SODIUM CHLORIDE SCH MLS/HR: 450; 150 INJECTION, SOLUTION INTRAVENOUS at 17:30

## 2018-05-20 RX ADMIN — NYSTATIN SCH GM: 100000 POWDER TOPICAL at 08:57

## 2018-05-20 RX ADMIN — ENALAPRILAT SCH MG: 1.25 INJECTION INTRAVENOUS at 18:24

## 2018-05-20 NOTE — PROGRESS NOTE
DATE:  May 20, 2018 



CARDIOLOGY PROGRESS NOTE



SUBJECTIVE:  The patient is somnolent.  However, does awaken to tactile 

stimuli.  Was able to deny chest pain or shortness of breath.



OBJECTIVE 

VITALS:  Temperature 97.1 degrees, pulse 64, respiratory rate 18, blood 

pressure 130/58, oxygen saturation 96%. 

GENERAL:  A well-developed, well-nourished woman somnolent and in no acute 

distress.   

LUNGS:  Clear to auscultation bilaterally.  No wheezes or crackles.

CARDIOVASCULAR:  Normal rate.  Regular rhythm.  No murmur.  Normal S1 and 

S2. 

ABDOMEN:  Soft and nontender.

EXTREMITIES:  No edema. 



CARDIAC MEDICATIONS 

1.  Metoprolol tartrate 5 mg IV q.8 h.

2.  Enalapril at 0.625 mg IV q.6 h.

3.  Furosemide 20 mg IV daily.

4.  Hydralazine 10 mg IV q.3 h.

5.  Clonidine patch.



LABS:  WBC 10.85, hemoglobin 8.4, hematocrit 28.2, and platelets 285,000.  

Sodium 139, potassium 3.5, chloride 109, CO2 21, BUN 11, creatinine 0.6.  

Telemetry is normal sinus rhythm.



IMPRESSION 

1.  Chest pain.

2.  Hypertension, improved.

3.  Paroxysmal atrial fibrillation, not on anticoagulation due to frequent 

falls.

4.  Recent right posterior cerebral artery cerebrovascular accident with 

resulting left hemiparesis.

5.  Escherichia coli methicillin-resistant Staphylococcus aureus urinary 

tract infection. 

6.  Hypertension.

7.  Hyperlipidemia.

8.  Diabetes mellitus. 

9.  Elevated right ventricular systolic pressure by echocardiogram. 

10. Left renal mass.



RECOMMENDATIONS:  Given the patient's bedbound status, poor response to 

physical therapy, medication compliance, and comorbid conditions, recommend 

conservative medical therapy.  Continue current cardiac medications.  

Antibiotics per primary service.



Thank you for this consult.  We will continue to follow.   









DD:  05/20/2018 16:22

DT:  05/20/2018 16:43

Job#:  [id number]

## 2018-05-21 VITALS — SYSTOLIC BLOOD PRESSURE: 192 MMHG | DIASTOLIC BLOOD PRESSURE: 77 MMHG

## 2018-05-21 VITALS — SYSTOLIC BLOOD PRESSURE: 194 MMHG | DIASTOLIC BLOOD PRESSURE: 77 MMHG

## 2018-05-21 VITALS — DIASTOLIC BLOOD PRESSURE: 85 MMHG | SYSTOLIC BLOOD PRESSURE: 179 MMHG

## 2018-05-21 VITALS — DIASTOLIC BLOOD PRESSURE: 73 MMHG | SYSTOLIC BLOOD PRESSURE: 169 MMHG

## 2018-05-21 VITALS — DIASTOLIC BLOOD PRESSURE: 72 MMHG | SYSTOLIC BLOOD PRESSURE: 185 MMHG

## 2018-05-21 LAB
DEPRECATED INR PLAS: 1.4
PROTHROMBIN TIME: 16.1 SECONDS (ref 11.9–14.5)

## 2018-05-21 RX ADMIN — POTASSIUM CHLORIDE AND SODIUM CHLORIDE SCH MLS/HR: 450; 150 INJECTION, SOLUTION INTRAVENOUS at 18:09

## 2018-05-21 RX ADMIN — HYDRALAZINE HYDROCHLORIDE PRN MG: 20 INJECTION INTRAMUSCULAR; INTRAVENOUS at 09:47

## 2018-05-21 RX ADMIN — Medication PRN MG: at 14:26

## 2018-05-21 RX ADMIN — CASTOR OIL AND BALSAM, PERU SCH GM: 788; 87 OINTMENT TOPICAL at 09:16

## 2018-05-21 RX ADMIN — METOCLOPRAMIDE SCH MG: 5 INJECTION, SOLUTION INTRAMUSCULAR; INTRAVENOUS at 18:08

## 2018-05-21 RX ADMIN — ENALAPRILAT SCH MG: 1.25 INJECTION INTRAVENOUS at 18:08

## 2018-05-21 RX ADMIN — TOLTERODINE TARTRATE SCH MG: 2 TABLET, FILM COATED ORAL at 09:00

## 2018-05-21 RX ADMIN — Medication SCH EA: at 09:00

## 2018-05-21 RX ADMIN — ENALAPRILAT SCH MG: 1.25 INJECTION INTRAVENOUS at 12:42

## 2018-05-21 RX ADMIN — NYSTATIN SCH GM: 100000 POWDER TOPICAL at 09:16

## 2018-05-21 RX ADMIN — METOCLOPRAMIDE SCH MG: 5 INJECTION, SOLUTION INTRAMUSCULAR; INTRAVENOUS at 20:56

## 2018-05-21 RX ADMIN — VANCOMYCIN HYDROCHLORIDE SCH MLS/HR: 500 INJECTION, SOLUTION INTRAVENOUS at 12:42

## 2018-05-21 RX ADMIN — FUROSEMIDE SCH MG: 10 INJECTION, SOLUTION INTRAMUSCULAR; INTRAVENOUS at 09:16

## 2018-05-21 RX ADMIN — METOCLOPRAMIDE SCH MG: 5 INJECTION, SOLUTION INTRAMUSCULAR; INTRAVENOUS at 12:41

## 2018-05-21 RX ADMIN — TOLTERODINE TARTRATE SCH MG: 2 TABLET, FILM COATED ORAL at 16:51

## 2018-05-21 RX ADMIN — POTASSIUM CHLORIDE AND SODIUM CHLORIDE SCH MLS/HR: 450; 150 INJECTION, SOLUTION INTRAVENOUS at 09:16

## 2018-05-21 RX ADMIN — CASTOR OIL AND BALSAM, PERU SCH GM: 788; 87 OINTMENT TOPICAL at 21:07

## 2018-05-21 RX ADMIN — MEGESTROL ACETATE SCH MG: 40 SUSPENSION ORAL at 09:00

## 2018-05-21 RX ADMIN — METOCLOPRAMIDE SCH MG: 5 INJECTION, SOLUTION INTRAMUSCULAR; INTRAVENOUS at 09:16

## 2018-05-21 RX ADMIN — VANCOMYCIN HYDROCHLORIDE SCH MLS/HR: 500 INJECTION, SOLUTION INTRAVENOUS at 01:22

## 2018-05-21 RX ADMIN — SODIUM CHLORIDE SCH GM: 9 INJECTION, SOLUTION INTRAVENOUS at 09:16

## 2018-05-21 RX ADMIN — METOPROLOL TARTRATE SCH MG: 1 INJECTION, SOLUTION INTRAVENOUS at 21:09

## 2018-05-21 RX ADMIN — METOPROLOL TARTRATE SCH MG: 1 INJECTION, SOLUTION INTRAVENOUS at 06:10

## 2018-05-21 RX ADMIN — METOPROLOL TARTRATE SCH MG: 1 INJECTION, SOLUTION INTRAVENOUS at 12:42

## 2018-05-21 RX ADMIN — ENALAPRILAT SCH MG: 1.25 INJECTION INTRAVENOUS at 06:10

## 2018-05-21 RX ADMIN — ENALAPRILAT SCH MG: 1.25 INJECTION INTRAVENOUS at 01:22

## 2018-05-21 RX ADMIN — SODIUM CHLORIDE SCH GM: 9 INJECTION, SOLUTION INTRAVENOUS at 21:07

## 2018-05-21 RX ADMIN — NYSTATIN SCH GM: 100000 POWDER TOPICAL at 21:10

## 2018-05-21 NOTE — PROGRESS NOTE
DATE:  May 21, 2018 



CARDIOLOGY PROGRESS NOTE



SUBJECTIVE:  The patient denies chest pain or shortness of breath.  She is 

scheduled for PEG today.  



OBJECTIVE 

VITALS:  Temperature 98.6 degrees, pulse 75, respiratory rate 18, blood 

pressure 192/77, oxygen saturation 95% on room air.  

GENERAL:  A well-developed, well-nourished woman in no acute distress.   

LUNGS:  Clear to auscultation bilaterally.  No wheezes or crackles.

CARDIOVASCULAR:  Normal rate.  Regular rhythm.  No murmur.  Normal S1 and 

S2. 

ABDOMEN:  Soft and nontender.

EXTREMITIES:  No edema. 

NEURO:  Confused.



CARDIAC MEDICATIONS 

1. Furosemide 20 mg IV daily.

2. Metoprolol tartrate 5 mg IV q.8 h.

3. Enalapril at 0.625 mg IV q.6 h.

4. Clonidine topically.



LABS:  INR 1.4. 



TELEMETRY:  Normal sinus rhythm.



IMPRESSION 

1. Chest pain.

2. Hypertension, uncontrolled. 

3. Paroxysmal atrial fibrillation, not on anticoagulation due to 

frequent falls.

4. Recent right posterior cerebral artery cerebrovascular accident with 

resulting left hemiparesis.

5. Escherichia coli and methicillin-resistant Staphylococcus aureus 

urinary tract infection. 

6. Hyperlipidemia.

7. Diabetes mellitus. 

8. Elevated right ventricular systolic pressure by echocardiogram. 

9. Left renal mass.



RECOMMENDATIONS:  Given the patient's bedbound status, poor response to 

physical therapy, medication noncompliance, and comorbid conditions, 

recommend conservative medical therapy.  Continue current cardiac 

medications.  Once PEG is in place and cleared for use, resume p.o. 

antihypertensive therapies.  Antibiotics per primary service.



Thank you for this consult.  We will continue to follow.   









DD:  05/21/2018 12:28

DT:  05/21/2018 12:48

Job#:  [id number]

## 2018-05-22 VITALS — DIASTOLIC BLOOD PRESSURE: 72 MMHG | SYSTOLIC BLOOD PRESSURE: 174 MMHG

## 2018-05-22 VITALS — SYSTOLIC BLOOD PRESSURE: 178 MMHG | DIASTOLIC BLOOD PRESSURE: 75 MMHG

## 2018-05-22 VITALS — SYSTOLIC BLOOD PRESSURE: 190 MMHG | DIASTOLIC BLOOD PRESSURE: 79 MMHG

## 2018-05-22 VITALS — SYSTOLIC BLOOD PRESSURE: 181 MMHG | DIASTOLIC BLOOD PRESSURE: 76 MMHG

## 2018-05-22 VITALS — DIASTOLIC BLOOD PRESSURE: 79 MMHG | SYSTOLIC BLOOD PRESSURE: 190 MMHG

## 2018-05-22 VITALS — SYSTOLIC BLOOD PRESSURE: 149 MMHG | DIASTOLIC BLOOD PRESSURE: 65 MMHG

## 2018-05-22 VITALS — DIASTOLIC BLOOD PRESSURE: 76 MMHG | SYSTOLIC BLOOD PRESSURE: 181 MMHG

## 2018-05-22 VITALS — DIASTOLIC BLOOD PRESSURE: 192 MMHG

## 2018-05-22 RX ADMIN — CASTOR OIL AND BALSAM, PERU SCH GM: 788; 87 OINTMENT TOPICAL at 21:00

## 2018-05-22 RX ADMIN — NYSTATIN SCH GM: 100000 POWDER TOPICAL at 21:00

## 2018-05-22 RX ADMIN — VANCOMYCIN HYDROCHLORIDE SCH MLS/HR: 500 INJECTION, SOLUTION INTRAVENOUS at 13:02

## 2018-05-22 RX ADMIN — METOCLOPRAMIDE SCH MG: 5 INJECTION, SOLUTION INTRAMUSCULAR; INTRAVENOUS at 17:31

## 2018-05-22 RX ADMIN — Medication PRN MG: at 13:03

## 2018-05-22 RX ADMIN — METOPROLOL TARTRATE SCH MG: 1 INJECTION, SOLUTION INTRAVENOUS at 05:46

## 2018-05-22 RX ADMIN — METOPROLOL TARTRATE SCH MG: 1 INJECTION, SOLUTION INTRAVENOUS at 13:02

## 2018-05-22 RX ADMIN — CASTOR OIL AND BALSAM, PERU SCH GM: 788; 87 OINTMENT TOPICAL at 08:14

## 2018-05-22 RX ADMIN — POTASSIUM CHLORIDE AND SODIUM CHLORIDE SCH MLS/HR: 450; 150 INJECTION, SOLUTION INTRAVENOUS at 17:31

## 2018-05-22 RX ADMIN — FUROSEMIDE SCH MG: 10 INJECTION, SOLUTION INTRAMUSCULAR; INTRAVENOUS at 08:14

## 2018-05-22 RX ADMIN — TOLTERODINE TARTRATE SCH MG: 2 TABLET, FILM COATED ORAL at 07:48

## 2018-05-22 RX ADMIN — ENALAPRILAT SCH MG: 1.25 INJECTION INTRAVENOUS at 05:47

## 2018-05-22 RX ADMIN — KETOROLAC TROMETHAMINE PRN MG: 30 INJECTION, SOLUTION INTRAMUSCULAR; INTRAVENOUS at 18:52

## 2018-05-22 RX ADMIN — TOLTERODINE TARTRATE SCH MG: 2 TABLET, FILM COATED ORAL at 18:52

## 2018-05-22 RX ADMIN — POTASSIUM CHLORIDE AND SODIUM CHLORIDE SCH MLS/HR: 450; 150 INJECTION, SOLUTION INTRAVENOUS at 06:14

## 2018-05-22 RX ADMIN — ENALAPRILAT SCH MG: 1.25 INJECTION INTRAVENOUS at 13:02

## 2018-05-22 RX ADMIN — CLONIDINE SCH EA: 0.2 PATCH TRANSDERMAL at 08:14

## 2018-05-22 RX ADMIN — NYSTATIN SCH GM: 100000 POWDER TOPICAL at 08:14

## 2018-05-22 RX ADMIN — VANCOMYCIN HYDROCHLORIDE SCH MLS/HR: 500 INJECTION, SOLUTION INTRAVENOUS at 01:00

## 2018-05-22 RX ADMIN — METOCLOPRAMIDE SCH MG: 5 INJECTION, SOLUTION INTRAMUSCULAR; INTRAVENOUS at 08:14

## 2018-05-22 RX ADMIN — MEGESTROL ACETATE SCH MG: 40 SUSPENSION ORAL at 07:48

## 2018-05-22 RX ADMIN — ENALAPRILAT SCH MG: 1.25 INJECTION INTRAVENOUS at 17:31

## 2018-05-22 RX ADMIN — HYDRALAZINE HYDROCHLORIDE PRN MG: 20 INJECTION INTRAMUSCULAR; INTRAVENOUS at 08:14

## 2018-05-22 RX ADMIN — METOCLOPRAMIDE SCH MG: 5 INJECTION, SOLUTION INTRAMUSCULAR; INTRAVENOUS at 13:02

## 2018-05-22 RX ADMIN — ENALAPRILAT SCH MG: 1.25 INJECTION INTRAVENOUS at 00:00

## 2018-05-22 RX ADMIN — Medication SCH EA: at 07:48

## 2018-05-22 RX ADMIN — CARVEDILOL SCH MG: 12.5 TABLET, FILM COATED ORAL at 18:52

## 2018-05-22 NOTE — PROGRESS NOTE
DATE:  May 22, 2018 



CARDIOLOGY PROGRESS NOTE



SUBJECTIVE:  The patient is confused.  She underwent PEG placement 

yesterday.  



OBJECTIVE 

VITALS:  Temperature 98.3 degrees, pulse 82, respiratory rate 18, blood 

pressure 190/79, oxygen saturation 95% on room air.  

GENERAL:  Elderly woman in no acute distress, confused.

LUNGS:  Clear to auscultation bilaterally.  No wheezes or crackles.

CARDIOVASCULAR:  Normal rate.  Regular rhythm.  No murmur.  Normal S1 and 

S2. 

ABDOMEN:  Soft and nontender.

EXTREMITIES:  No edema. 

NEURO:  Confused.



CARDIAC MEDICATIONS 

1. Metoprolol tartrate 5 mg IV q.8 h.

2. Furosemide 20 mg IV daily.



LABS:  None today.



TELEMETRY:  Normal sinus rhythm.



IMPRESSION 

1. Chest pain.

2. Hypertension, uncontrolled. 

3. Paroxysmal atrial fibrillation, not on anticoagulation due to 

frequent falls.

4. Recent right posterior cerebral artery cerebrovascular accident with 

resulting left hemiparesis.

5. Escherichia coli and methicillin-resistant Staphylococcus aureus 

urinary tract infection. 

6. Hyperlipidemia.

7. Diabetes mellitus. 

8. Elevated right ventricular systolic pressure by echocardiogram. 

9. Left renal mass.



RECOMMENDATIONS:  Given the patient's bedbound status, poor response to 

physical therapy, medication noncompliance, and comorbid conditions, 

recommend conservative medical therapy.  Continue current cardiac 

medications.  P.O. antihypertensive medications have been ordered once PEG 

is cleared for use.  Antibiotics per primary service.  



Thank you for this consult.  We will continue to follow.   













DD:  05/22/2018 13:43

DT:  05/22/2018 14:00

Job#:  [id number]

## 2018-05-23 VITALS — DIASTOLIC BLOOD PRESSURE: 81 MMHG | SYSTOLIC BLOOD PRESSURE: 192 MMHG

## 2018-05-23 VITALS — DIASTOLIC BLOOD PRESSURE: 61 MMHG | SYSTOLIC BLOOD PRESSURE: 139 MMHG

## 2018-05-23 VITALS — SYSTOLIC BLOOD PRESSURE: 137 MMHG | DIASTOLIC BLOOD PRESSURE: 58 MMHG

## 2018-05-23 VITALS — SYSTOLIC BLOOD PRESSURE: 158 MMHG | DIASTOLIC BLOOD PRESSURE: 64 MMHG

## 2018-05-23 VITALS — SYSTOLIC BLOOD PRESSURE: 180 MMHG | DIASTOLIC BLOOD PRESSURE: 74 MMHG

## 2018-05-23 VITALS — DIASTOLIC BLOOD PRESSURE: 79 MMHG | SYSTOLIC BLOOD PRESSURE: 181 MMHG

## 2018-05-23 VITALS — SYSTOLIC BLOOD PRESSURE: 155 MMHG | DIASTOLIC BLOOD PRESSURE: 68 MMHG

## 2018-05-23 LAB
ANION GAP SERPL CALC-SCNC: 10.8 MMOL/L (ref 8–16)
BASOPHILS # BLD AUTO: 0 10*3/UL (ref 0–0.1)
BASOPHILS NFR BLD AUTO: 0.6 % (ref 0–1)
BUN SERPL-MCNC: 20 MG/DL (ref 7–26)
BUN/CREAT SERPL: 32 (ref 6–25)
CA-I BLDV-MCNC: 1.6 MMOL/L (ref 1.09–1.3)
CALCIUM SERPL-MCNC: 11.1 MG/DL (ref 8.4–10.2)
CHLORIDE SERPL-SCNC: 106 MMOL/L (ref 98–107)
CO2 SERPL-SCNC: 23 MMOL/L (ref 22–29)
DEPRECATED NEUTROPHILS # BLD AUTO: 5.1 10*3/UL (ref 2.1–6.9)
EGFRCR SERPLBLD CKD-EPI 2021: > 60 ML/MIN (ref 60–?)
EOSINOPHIL # BLD AUTO: 0.2 10*3/UL (ref 0–0.4)
EOSINOPHIL NFR BLD AUTO: 3.3 % (ref 0–6)
ERYTHROCYTE [DISTWIDTH] IN CORD BLOOD: 16.7 % (ref 11.7–14.4)
GLUCOSE SERPLBLD-MCNC: 126 MG/DL (ref 74–118)
HCT VFR BLD AUTO: 27.1 % (ref 34.2–44.1)
HGB BLD-MCNC: 8.2 G/DL (ref 12–16)
LYMPHOCYTES # BLD: 1.2 10*3/UL (ref 1–3.2)
LYMPHOCYTES NFR BLD AUTO: 16.4 % (ref 18–39.1)
MCH RBC QN AUTO: 22.4 PG (ref 28–32)
MCHC RBC AUTO-ENTMCNC: 30.3 G/DL (ref 31–35)
MCV RBC AUTO: 74 FL (ref 81–99)
MONOCYTES # BLD AUTO: 0.5 10*3/UL (ref 0.2–0.8)
MONOCYTES NFR BLD AUTO: 6.9 % (ref 4.4–11.3)
NEUTS SEG NFR BLD AUTO: 72.5 % (ref 38.7–80)
PLATELET # BLD AUTO: 257 X10E3/UL (ref 140–360)
POTASSIUM SERPL-SCNC: 3.8 MMOL/L (ref 3.5–5.1)
RBC # BLD AUTO: 3.66 X10E6/UL (ref 3.6–5.1)
SODIUM SERPL-SCNC: 136 MMOL/L (ref 136–145)

## 2018-05-23 RX ADMIN — LISINOPRIL SCH MG: 20 TABLET ORAL at 09:51

## 2018-05-23 RX ADMIN — CASTOR OIL AND BALSAM, PERU SCH GM: 788; 87 OINTMENT TOPICAL at 21:31

## 2018-05-23 RX ADMIN — CARVEDILOL SCH MG: 12.5 TABLET, FILM COATED ORAL at 16:00

## 2018-05-23 RX ADMIN — NYSTATIN SCH GM: 100000 POWDER TOPICAL at 09:52

## 2018-05-23 RX ADMIN — NYSTATIN SCH GM: 100000 POWDER TOPICAL at 21:31

## 2018-05-23 RX ADMIN — SODIUM CHLORIDE SCH MLS/HR: 9 INJECTION, SOLUTION INTRAVENOUS at 14:26

## 2018-05-23 RX ADMIN — TOLTERODINE TARTRATE SCH MG: 2 TABLET, FILM COATED ORAL at 16:00

## 2018-05-23 RX ADMIN — MEGESTROL ACETATE SCH MG: 40 SUSPENSION ORAL at 09:51

## 2018-05-23 RX ADMIN — KETOROLAC TROMETHAMINE PRN MG: 30 INJECTION, SOLUTION INTRAMUSCULAR; INTRAVENOUS at 21:35

## 2018-05-23 RX ADMIN — Medication SCH EA: at 09:52

## 2018-05-23 RX ADMIN — HYDRALAZINE HYDROCHLORIDE PRN MG: 20 INJECTION INTRAMUSCULAR; INTRAVENOUS at 12:05

## 2018-05-23 RX ADMIN — TOLTERODINE TARTRATE SCH MG: 2 TABLET, FILM COATED ORAL at 09:51

## 2018-05-23 RX ADMIN — FUROSEMIDE SCH MG: 10 INJECTION, SOLUTION INTRAMUSCULAR; INTRAVENOUS at 09:51

## 2018-05-23 RX ADMIN — POTASSIUM CHLORIDE AND SODIUM CHLORIDE SCH MLS/HR: 450; 150 INJECTION, SOLUTION INTRAVENOUS at 08:03

## 2018-05-23 RX ADMIN — CARVEDILOL SCH MG: 12.5 TABLET, FILM COATED ORAL at 09:51

## 2018-05-23 RX ADMIN — CASTOR OIL AND BALSAM, PERU SCH GM: 788; 87 OINTMENT TOPICAL at 09:52

## 2018-05-23 RX ADMIN — VANCOMYCIN HYDROCHLORIDE SCH MLS/HR: 500 INJECTION, SOLUTION INTRAVENOUS at 01:07

## 2018-05-23 NOTE — PROGRESS NOTE
DATE:  May 23, 2018 



CARDIOLOGY PROGRESS NOTE



SUBJECTIVE:  The patient is somnolent but does deny chest pain or shortness 

of breath. 



OBJECTIVE 

VITALS:  Temperature 96.9 degrees, pulse 76, respiratory rate 18, blood 

pressure 192/81, oxygen saturation 98% on room air.  

GENERAL:  Elderly woman, somnolent, in no acute distress.

LUNGS:  Clear to auscultation bilaterally.  No wheezes or crackles.

CARDIOVASCULAR:  Normal rate.  Regular rhythm.  No murmur.  Normal S1 and 

S2. 

ABDOMEN:  Soft and nontender.

EXTREMITIES:  No edema. 

NEURO:  Lethargic.



CARDIAC MEDICATIONS 

1. Carvedilol 25 mg p.o. b.i.d. 

2. Lisinopril 40 mg p.o. daily.

3. Amlodipine 5 mg p.o. daily.

4. Furosemide 20 mg IV daily.

5. Clonidine patch.



TELEMETRY:  Normal sinus rhythm.



IMPRESSION 

1. Chest pain.

2. Hypertension, uncontrolled. 

3. Paroxysmal atrial fibrillation, not on anticoagulation due to 

frequent falls.

4. Recent right posterior cerebral artery cerebrovascular accident with 

resulting left hemiparesis.

5. Escherichia coli and methicillin-resistant Staphylococcus aureus 

urinary tract infection. 

6. Hyperlipidemia.

7. Diabetes mellitus. 

8. Elevated right ventricular systolic pressure by echocardiogram. 

9. Left renal mass.



RECOMMENDATIONS:  Given the patient's bedbound status, poor response to 

physical therapy, medication noncompliance, and comorbid conditions, 

recommend conservative medical therapy.  Titrate up amlodipine given 

continued uncontrolled blood pressure.  Antibiotics per primary service.  



Thank you for this consult.  We will continue to follow.   











DD:  05/23/2018 17:01

DT:  05/23/2018 17:08

Job#:  [id number]

## 2018-05-24 VITALS — DIASTOLIC BLOOD PRESSURE: 61 MMHG | SYSTOLIC BLOOD PRESSURE: 139 MMHG

## 2018-05-24 VITALS — DIASTOLIC BLOOD PRESSURE: 63 MMHG | SYSTOLIC BLOOD PRESSURE: 141 MMHG

## 2018-05-24 VITALS — SYSTOLIC BLOOD PRESSURE: 135 MMHG | DIASTOLIC BLOOD PRESSURE: 56 MMHG

## 2018-05-24 VITALS — SYSTOLIC BLOOD PRESSURE: 125 MMHG | DIASTOLIC BLOOD PRESSURE: 62 MMHG

## 2018-05-24 RX ADMIN — FUROSEMIDE SCH MG: 10 INJECTION, SOLUTION INTRAMUSCULAR; INTRAVENOUS at 09:22

## 2018-05-24 RX ADMIN — MEGESTROL ACETATE SCH MG: 40 SUSPENSION ORAL at 09:23

## 2018-05-24 RX ADMIN — CASTOR OIL AND BALSAM, PERU SCH GM: 788; 87 OINTMENT TOPICAL at 09:23

## 2018-05-24 RX ADMIN — LISINOPRIL SCH MG: 20 TABLET ORAL at 09:23

## 2018-05-24 RX ADMIN — NYSTATIN SCH GM: 100000 POWDER TOPICAL at 09:23

## 2018-05-24 RX ADMIN — SODIUM CHLORIDE SCH MLS/HR: 9 INJECTION, SOLUTION INTRAVENOUS at 09:23

## 2018-05-24 RX ADMIN — CARVEDILOL SCH MG: 12.5 TABLET, FILM COATED ORAL at 09:23

## 2018-05-24 RX ADMIN — SODIUM CHLORIDE SCH MLS/HR: 9 INJECTION, SOLUTION INTRAVENOUS at 06:26

## 2018-05-24 RX ADMIN — KETOROLAC TROMETHAMINE PRN MG: 30 INJECTION, SOLUTION INTRAMUSCULAR; INTRAVENOUS at 10:19

## 2018-05-24 RX ADMIN — Medication SCH EA: at 09:23

## 2018-05-24 RX ADMIN — TOLTERODINE TARTRATE SCH MG: 2 TABLET, FILM COATED ORAL at 09:23

## 2018-05-24 NOTE — PROGRESS NOTE
DATE:  May 24, 2018 



CARDIOLOGY PROGRESS NOTE



SUBJECTIVE:  The patient is quite lethargic but does deny chest pain or 

shortness of breath. 



OBJECTIVE 

VITALS:  Temperature 98.9 degrees, pulse 76, respiratory rate 18, blood 

pressure 141/63, oxygen saturation 97% on nasal cannula. 

GENERAL:  Elderly woman, lethargic, in no acute distress, chronically ill 

appearing.

LUNGS:  Clear to auscultation bilaterally.  No wheezes or crackles.

CARDIOVASCULAR:  Normal rate.  Regular rhythm.  No murmur.  Normal S1 and 

S2. 

ABDOMEN:  Soft and nontender.

EXTREMITIES:  No edema. 

NEURO:  Lethargic.



CARDIAC MEDICATIONS 

1. Amlodipine 10 mg p.o. daily.

2. Lisinopril 40 mg p.o. daily.

3. Carvedilol 25 mg p.o. b.i.d. 

4. Furosemide 20 mg IV daily.

5. Clonidine patch.



LABS:  None today.



TELEMETRY:  Normal sinus rhythm.



IMPRESSION 

1. Chest pain.

2. Hypertension, improved. 

3. Paroxysmal atrial fibrillation, not on anticoagulation due to 

frequent falls.

4. Recent right posterior cerebral artery cerebrovascular accident with 

resulting left hemiparesis.

5. Escherichia coli and methicillin-resistant Staphylococcus aureus 

urinary tract infection. 

6. Hyperlipidemia.

7. Diabetes mellitus. 

8. Elevated right ventricular systolic pressure by echocardiogram. 

9. Left renal mass.



RECOMMENDATIONS:  Given the patient's bedbound status, poor response to 

physical therapy, and comorbid conditions, recommend conservative medical 

therapy.  Continue current cardiac medications.  The patient's blood 

pressure is now adequate for age.  Antibiotics per primary service.  



Thank you for this consult.  We will continue to follow.   









DD:  05/24/2018 10:09

DT:  05/24/2018 10:19

Job#:  [id number]